# Patient Record
Sex: MALE | Race: BLACK OR AFRICAN AMERICAN | NOT HISPANIC OR LATINO | URBAN - METROPOLITAN AREA
[De-identification: names, ages, dates, MRNs, and addresses within clinical notes are randomized per-mention and may not be internally consistent; named-entity substitution may affect disease eponyms.]

---

## 2017-06-01 ENCOUNTER — OUTPATIENT (OUTPATIENT)
Dept: OUTPATIENT SERVICES | Facility: HOSPITAL | Age: 77
LOS: 1 days | End: 2017-06-01
Payer: MEDICAID

## 2017-06-20 ENCOUNTER — INPATIENT (INPATIENT)
Facility: HOSPITAL | Age: 77
LOS: 4 days | Discharge: HOME | DRG: 247 | End: 2017-06-25
Attending: HOSPITALIST | Admitting: INTERNAL MEDICINE
Payer: MEDICAID

## 2017-06-20 VITALS
SYSTOLIC BLOOD PRESSURE: 158 MMHG | HEIGHT: 66 IN | TEMPERATURE: 97 F | WEIGHT: 187.39 LBS | HEART RATE: 74 BPM | OXYGEN SATURATION: 94 % | DIASTOLIC BLOOD PRESSURE: 106 MMHG | RESPIRATION RATE: 19 BRPM

## 2017-06-20 DIAGNOSIS — I21.3 ST ELEVATION (STEMI) MYOCARDIAL INFARCTION OF UNSPECIFIED SITE: ICD-10-CM

## 2017-06-20 DIAGNOSIS — I23.8 OTHER CURRENT COMPLICATIONS FOLLOWING ACUTE MYOCARDIAL INFARCTION: ICD-10-CM

## 2017-06-20 DIAGNOSIS — Z29.9 ENCOUNTER FOR PROPHYLACTIC MEASURES, UNSPECIFIED: ICD-10-CM

## 2017-06-20 DIAGNOSIS — Z89.619 ACQUIRED ABSENCE OF UNSPECIFIED LEG ABOVE KNEE: Chronic | ICD-10-CM

## 2017-06-20 LAB
ALBUMIN SERPL ELPH-MCNC: 3.6 G/DL — SIGNIFICANT CHANGE UP (ref 3.3–5)
ALP SERPL-CCNC: 57 U/L — SIGNIFICANT CHANGE UP (ref 40–120)
ALT FLD-CCNC: 17 U/L RC — SIGNIFICANT CHANGE UP (ref 10–45)
ANION GAP SERPL CALC-SCNC: 14 MMOL/L — SIGNIFICANT CHANGE UP (ref 5–17)
ANION GAP SERPL CALC-SCNC: 14 MMOL/L — SIGNIFICANT CHANGE UP (ref 5–17)
APTT BLD: 62.9 SEC — HIGH (ref 27.5–37.4)
AST SERPL-CCNC: 25 U/L — SIGNIFICANT CHANGE UP (ref 10–40)
BILIRUB SERPL-MCNC: 0.3 MG/DL — SIGNIFICANT CHANGE UP (ref 0.2–1.2)
BUN SERPL-MCNC: 13 MG/DL — SIGNIFICANT CHANGE UP (ref 7–23)
BUN SERPL-MCNC: 13 MG/DL — SIGNIFICANT CHANGE UP (ref 7–23)
CALCIUM SERPL-MCNC: 8.8 MG/DL — SIGNIFICANT CHANGE UP (ref 8.4–10.5)
CALCIUM SERPL-MCNC: 8.8 MG/DL — SIGNIFICANT CHANGE UP (ref 8.4–10.5)
CHLORIDE SERPL-SCNC: 101 MMOL/L — SIGNIFICANT CHANGE UP (ref 96–108)
CHLORIDE SERPL-SCNC: 104 MMOL/L — SIGNIFICANT CHANGE UP (ref 96–108)
CHOLEST SERPL-MCNC: 198 MG/DL — SIGNIFICANT CHANGE UP (ref 10–199)
CK MB BLD-MCNC: 4 % — HIGH (ref 0–3.5)
CK MB BLD-MCNC: 7.3 % — HIGH (ref 0–3.5)
CK MB BLD-MCNC: 9.6 % — HIGH (ref 0–3.5)
CK MB CFR SERPL CALC: 11 NG/ML — HIGH (ref 0–6.7)
CK MB CFR SERPL CALC: 292.4 NG/ML — HIGH (ref 0–6.7)
CK MB CFR SERPL CALC: 434 NG/ML — HIGH (ref 0–6.7)
CK SERPL-CCNC: 274 U/L — HIGH (ref 30–200)
CK SERPL-CCNC: 3991 U/L — HIGH (ref 30–200)
CK SERPL-CCNC: 4527 U/L — HIGH (ref 30–200)
CO2 SERPL-SCNC: 23 MMOL/L — SIGNIFICANT CHANGE UP (ref 22–31)
CO2 SERPL-SCNC: 24 MMOL/L — SIGNIFICANT CHANGE UP (ref 22–31)
CREAT SERPL-MCNC: 0.65 MG/DL — SIGNIFICANT CHANGE UP (ref 0.5–1.3)
CREAT SERPL-MCNC: 0.81 MG/DL — SIGNIFICANT CHANGE UP (ref 0.5–1.3)
GLUCOSE SERPL-MCNC: 133 MG/DL — HIGH (ref 70–99)
GLUCOSE SERPL-MCNC: 145 MG/DL — HIGH (ref 70–99)
HBA1C BLD-MCNC: 6.1 % — HIGH (ref 4–5.6)
HCT VFR BLD CALC: 39.1 % — SIGNIFICANT CHANGE UP (ref 39–50)
HCT VFR BLD CALC: 42 % — SIGNIFICANT CHANGE UP (ref 39–50)
HDLC SERPL-MCNC: 61 MG/DL — SIGNIFICANT CHANGE UP (ref 40–125)
HGB BLD-MCNC: 12.8 G/DL — LOW (ref 13–17)
HGB BLD-MCNC: 13.4 G/DL — SIGNIFICANT CHANGE UP (ref 13–17)
INR BLD: 1.06 RATIO — SIGNIFICANT CHANGE UP (ref 0.88–1.16)
LIPID PNL WITH DIRECT LDL SERPL: 128 MG/DL — SIGNIFICANT CHANGE UP
MAGNESIUM SERPL-MCNC: 1.9 MG/DL — SIGNIFICANT CHANGE UP (ref 1.6–2.6)
MAGNESIUM SERPL-MCNC: 2.1 MG/DL — SIGNIFICANT CHANGE UP (ref 1.6–2.6)
MCHC RBC-ENTMCNC: 26.1 PG — LOW (ref 27–34)
MCHC RBC-ENTMCNC: 26.3 PG — LOW (ref 27–34)
MCHC RBC-ENTMCNC: 31.9 GM/DL — LOW (ref 32–36)
MCHC RBC-ENTMCNC: 32.6 GM/DL — SIGNIFICANT CHANGE UP (ref 32–36)
MCV RBC AUTO: 80.6 FL — SIGNIFICANT CHANGE UP (ref 80–100)
MCV RBC AUTO: 81.7 FL — SIGNIFICANT CHANGE UP (ref 80–100)
PHOSPHATE SERPL-MCNC: 3 MG/DL — SIGNIFICANT CHANGE UP (ref 2.5–4.5)
PHOSPHATE SERPL-MCNC: 3 MG/DL — SIGNIFICANT CHANGE UP (ref 2.5–4.5)
PLATELET # BLD AUTO: 186 K/UL — SIGNIFICANT CHANGE UP (ref 150–400)
PLATELET # BLD AUTO: 190 K/UL — SIGNIFICANT CHANGE UP (ref 150–400)
POTASSIUM SERPL-MCNC: 4.1 MMOL/L — SIGNIFICANT CHANGE UP (ref 3.5–5.3)
POTASSIUM SERPL-MCNC: 4.4 MMOL/L — SIGNIFICANT CHANGE UP (ref 3.5–5.3)
POTASSIUM SERPL-SCNC: 4.1 MMOL/L — SIGNIFICANT CHANGE UP (ref 3.5–5.3)
POTASSIUM SERPL-SCNC: 4.4 MMOL/L — SIGNIFICANT CHANGE UP (ref 3.5–5.3)
PROT SERPL-MCNC: 7.2 G/DL — SIGNIFICANT CHANGE UP (ref 6–8.3)
PROTHROM AB SERPL-ACNC: 11.5 SEC — SIGNIFICANT CHANGE UP (ref 9.8–12.7)
RBC # BLD: 4.85 M/UL — SIGNIFICANT CHANGE UP (ref 4.2–5.8)
RBC # BLD: 5.14 M/UL — SIGNIFICANT CHANGE UP (ref 4.2–5.8)
RBC # FLD: 13.8 % — SIGNIFICANT CHANGE UP (ref 10.3–14.5)
RBC # FLD: 13.8 % — SIGNIFICANT CHANGE UP (ref 10.3–14.5)
SODIUM SERPL-SCNC: 138 MMOL/L — SIGNIFICANT CHANGE UP (ref 135–145)
SODIUM SERPL-SCNC: 142 MMOL/L — SIGNIFICANT CHANGE UP (ref 135–145)
TOTAL CHOLESTEROL/HDL RATIO MEASUREMENT: 3.2 RATIO — LOW (ref 3.4–9.6)
TRIGL SERPL-MCNC: 46 MG/DL — SIGNIFICANT CHANGE UP (ref 10–149)
TROPONIN T SERPL-MCNC: 0.14 NG/ML — HIGH (ref 0–0.06)
TROPONIN T SERPL-MCNC: 11.77 NG/ML — HIGH (ref 0–0.06)
TROPONIN T SERPL-MCNC: 12.05 NG/ML — HIGH (ref 0–0.06)
TSH SERPL-MCNC: 3.91 UIU/ML — SIGNIFICANT CHANGE UP (ref 0.27–4.2)
WBC # BLD: 10.6 K/UL — HIGH (ref 3.8–10.5)
WBC # BLD: 8.4 K/UL — SIGNIFICANT CHANGE UP (ref 3.8–10.5)
WBC # FLD AUTO: 10.6 K/UL — HIGH (ref 3.8–10.5)
WBC # FLD AUTO: 8.4 K/UL — SIGNIFICANT CHANGE UP (ref 3.8–10.5)

## 2017-06-20 PROCEDURE — 92941 PRQ TRLML REVSC TOT OCCL AMI: CPT | Mod: LD,GC

## 2017-06-20 PROCEDURE — 93306 TTE W/DOPPLER COMPLETE: CPT | Mod: 26

## 2017-06-20 PROCEDURE — 99291 CRITICAL CARE FIRST HOUR: CPT

## 2017-06-20 PROCEDURE — 71010: CPT | Mod: 26

## 2017-06-20 PROCEDURE — 71250 CT THORAX DX C-: CPT | Mod: 26

## 2017-06-20 PROCEDURE — 74177 CT ABD & PELVIS W/CONTRAST: CPT | Mod: 26

## 2017-06-20 PROCEDURE — 93010 ELECTROCARDIOGRAM REPORT: CPT

## 2017-06-20 PROCEDURE — 93458 L HRT ARTERY/VENTRICLE ANGIO: CPT | Mod: 26,59,GC

## 2017-06-20 RX ORDER — CLOPIDOGREL BISULFATE 75 MG/1
75 TABLET, FILM COATED ORAL DAILY
Qty: 0 | Refills: 0 | Status: DISCONTINUED | OUTPATIENT
Start: 2017-06-20 | End: 2017-06-25

## 2017-06-20 RX ORDER — HEPARIN SODIUM 5000 [USP'U]/ML
6500 INJECTION INTRAVENOUS; SUBCUTANEOUS EVERY 6 HOURS
Qty: 0 | Refills: 0 | Status: DISCONTINUED | OUTPATIENT
Start: 2017-06-20 | End: 2017-06-25

## 2017-06-20 RX ORDER — HEPARIN SODIUM 5000 [USP'U]/ML
5000 INJECTION INTRAVENOUS; SUBCUTANEOUS EVERY 8 HOURS
Qty: 0 | Refills: 0 | Status: DISCONTINUED | OUTPATIENT
Start: 2017-06-20 | End: 2017-06-20

## 2017-06-20 RX ORDER — HEPARIN SODIUM 5000 [USP'U]/ML
INJECTION INTRAVENOUS; SUBCUTANEOUS
Qty: 25000 | Refills: 0 | Status: DISCONTINUED | OUTPATIENT
Start: 2017-06-20 | End: 2017-06-25

## 2017-06-20 RX ORDER — ASPIRIN/CALCIUM CARB/MAGNESIUM 324 MG
81 TABLET ORAL DAILY
Qty: 0 | Refills: 0 | Status: DISCONTINUED | OUTPATIENT
Start: 2017-06-20 | End: 2017-06-25

## 2017-06-20 RX ORDER — MAGNESIUM SULFATE 500 MG/ML
1 VIAL (ML) INJECTION ONCE
Qty: 0 | Refills: 0 | Status: COMPLETED | OUTPATIENT
Start: 2017-06-20 | End: 2017-06-20

## 2017-06-20 RX ORDER — ATORVASTATIN CALCIUM 80 MG/1
80 TABLET, FILM COATED ORAL AT BEDTIME
Qty: 0 | Refills: 0 | Status: DISCONTINUED | OUTPATIENT
Start: 2017-06-20 | End: 2017-06-25

## 2017-06-20 RX ORDER — CARVEDILOL PHOSPHATE 80 MG/1
3.12 CAPSULE, EXTENDED RELEASE ORAL EVERY 12 HOURS
Qty: 0 | Refills: 0 | Status: DISCONTINUED | OUTPATIENT
Start: 2017-06-20 | End: 2017-06-25

## 2017-06-20 RX ORDER — HEPARIN SODIUM 5000 [USP'U]/ML
3000 INJECTION INTRAVENOUS; SUBCUTANEOUS EVERY 6 HOURS
Qty: 0 | Refills: 0 | Status: DISCONTINUED | OUTPATIENT
Start: 2017-06-20 | End: 2017-06-25

## 2017-06-20 RX ORDER — HEPARIN SODIUM 5000 [USP'U]/ML
4000 INJECTION INTRAVENOUS; SUBCUTANEOUS EVERY 6 HOURS
Qty: 0 | Refills: 0 | Status: DISCONTINUED | OUTPATIENT
Start: 2017-06-20 | End: 2017-06-20

## 2017-06-20 RX ORDER — LISINOPRIL 2.5 MG/1
5 TABLET ORAL DAILY
Qty: 0 | Refills: 0 | Status: DISCONTINUED | OUTPATIENT
Start: 2017-06-20 | End: 2017-06-25

## 2017-06-20 RX ORDER — HEPARIN SODIUM 5000 [USP'U]/ML
INJECTION INTRAVENOUS; SUBCUTANEOUS
Qty: 25000 | Refills: 0 | Status: DISCONTINUED | OUTPATIENT
Start: 2017-06-20 | End: 2017-06-20

## 2017-06-20 RX ADMIN — ATORVASTATIN CALCIUM 80 MILLIGRAM(S): 80 TABLET, FILM COATED ORAL at 21:19

## 2017-06-20 RX ADMIN — CARVEDILOL PHOSPHATE 3.12 MILLIGRAM(S): 80 CAPSULE, EXTENDED RELEASE ORAL at 17:05

## 2017-06-20 RX ADMIN — HEPARIN SODIUM 1500 UNIT(S)/HR: 5000 INJECTION INTRAVENOUS; SUBCUTANEOUS at 20:30

## 2017-06-20 RX ADMIN — CARVEDILOL PHOSPHATE 3.12 MILLIGRAM(S): 80 CAPSULE, EXTENDED RELEASE ORAL at 06:14

## 2017-06-20 RX ADMIN — LISINOPRIL 5 MILLIGRAM(S): 2.5 TABLET ORAL at 15:45

## 2017-06-20 RX ADMIN — Medication 100 GRAM(S): at 06:14

## 2017-06-20 NOTE — H&P ADULT - ATTENDING COMMENTS
STEMI s/p PCI with BMS to LAD  - educated on the importance of DAPT   - Beta-blocker initiated  - patient is on Lipitor 80 mg daily  - trend cardiac enzymes to peak  - TTE prior to discharge     Will check chest CT to evaluate reported lung mass.

## 2017-06-20 NOTE — H&P ADULT - PROBLEM SELECTOR PLAN 1
-Pan for urgent cath. Pt has been loaded with aspirin/plavix.  -Wll start BB as tolerated. Hold ACEi until after cath. Start lipitor  -continue to trend cardiac enzymes.   -f/u TTE

## 2017-06-20 NOTE — H&P ADULT - ASSESSMENT
76 M with no medical hx transferred from [facility] for urgent cath for STEMI.

## 2017-06-20 NOTE — PROGRESS NOTE ADULT - SUBJECTIVE AND OBJECTIVE BOX
Removal of Femoral Sheath     Pulses in the right lower extremity are palpable. The patient was placed in the supine position. The insertion site was identified and the sutures were removed per protocol. The 6.0 Maori femoral shealth was then removed. Direct pressure was applied for 20 minutes. Monitoring of the right groin and both lower extremities including neuro-vascular checks and vital signs every 15 minutes x 4, then every 30 minutes x 2, then every 1 hour was ordered.       Removed By: CALDERON Alvarez  Date: 6/17/2017   Time: 11:00am    2-hours post-removal of femoral sheath assessment of access site    Vital signs are stable, neuro-vascular status of the lower extremities is intact, stable and there is no evidence of hematoma on the right lower extremity.    Re-assessed By: CALDERON Alvarez  Date: 6/17/2017  Time: 13:00pm Removal of Femoral Sheath     Pulses in the right lower extremity are palpable. The patient was placed in the supine position. The insertion site was identified and the sutures were removed per protocol. The 6.0 German femoral shealth was then removed. Direct pressure was applied for 20 minutes. Monitoring of the right groin and both lower extremities including neuro-vascular checks and vital signs every 15 minutes x 4, then every 30 minutes x 2, then every 1 hour was ordered.       Removed By: CALDERON Alvarez  Date: 6/20/2017   Time: 11:00am    2-hours post-removal of femoral sheath assessment of access site    Vital signs are stable, neuro-vascular status of the lower extremities is intact, stable and there is no evidence of hematoma on the right lower extremity.    Re-assessed By: CALDERON Alvarez  Date: 6/20/2017  Time: 13:00pm

## 2017-06-20 NOTE — H&P ADULT - NSHPSOCIALHISTORY_GEN_ALL_CORE
Former smoker, 1.5 pack years, quit > 20 years ago  Occasional alcohol use, torsten.  Denies drug us

## 2017-06-20 NOTE — H&P ADULT - HISTORY OF PRESENT ILLNESS
76 M with no active medical hx who initially presented to [facility] with abdominal pain that started at 1AM found to have ST-elevations in leads V1-V6 and II, III, and AVF and subsequently transferred to [facility] for urgent cath. Pt reports abdominal pain is sharp in nature. Pain does not radiate and he denies any active chest pain/SOB. Denies nausea, vomiting, melena, or hematochezia. Patient is a former smoker (~1.5 pack years) and has quit for over 20 years. Denies any family history of cardiac disease.

## 2017-06-20 NOTE — H&P ADULT - NSHPPHYSICALEXAM_GEN_ALL_CORE
General: NAD  HEENT: NC/AT  Res: CTAB, no increased WOB  Cardio: S1/S2, RRR  GI: Pain over middle lower abdomen. Soft, NT, with + BS  MSK: RLE stump General: NAD  HEENT: NC/AT  Res: CTAB, no increased WOB  Cardio: S1/S2, RRR  GI: Pain over middle lower abdomen. Soft, NT, with + BS  MSK: RLE stump  Skin: c/d/i  Neuro: No focal deficits  Psych: Appropriate affect

## 2017-06-20 NOTE — CHART NOTE - NSCHARTNOTEFT_GEN_A_CORE
CCU MIDNIGHT ROUNDS    TIERA WITT  [id number]  76y  Male    SUMMARY:    HPI:  76 M with no active medical hx who initially presented to [facility] with abdominal pain that started at 1AM found to have ST-elevations in leads V1-V6 and II, III, and AVF and subsequently transferred to [facility] for urgent cath. Pt reports abdominal pain is sharp in nature. Pain does not radiate and he denies any active chest pain/SOB. Denies nausea, vomiting, melena, or hematochezia. Patient is a former smoker (~1.5 pack years) and has quit for over 20 years. Denies any family history of cardiac disease. (20 Jun 2017 04:34)      NEW EVENTS: Only faint bowel sounds on auscultation of abdomen. CT chest negative for mass      UPDATED VITALS:    ICU Vital Signs Last 24 Hrs  T(C): 37.2, Max: 37.2 (06-20 @ 20:15)  T(F): 98.9, Max: 98.9 (06-20 @ 20:15)  HR: 74 (68 - 86)  BP: 128/75 (123/85 - 168/95)  BP(mean): 91 (91 - 132)  ABP: --  ABP(mean): --  RR: 11 (11 - 27)  SpO2: 96% (90% - 98%)      I&O's Summary  I & Os for 24h ending 20 Jun 2017 07:00  =============================================  IN: 160 ml / OUT: 200 ml / NET: -40 ml    I & Os for current day (as of 20 Jun 2017 23:16)  =============================================  IN: 375 ml / OUT: 1500 ml / NET: -1125 ml          NEW LABS:                          12.8   10.6  )-----------( 186      ( 20 Jun 2017 10:14 )             39.1     06-20    138  |  101  |  13  ----------------------------<  145<H>  4.4   |  23  |  0.65    Ca    8.8      20 Jun 2017 10:14  Phos  3.0     06-20  Mg     2.1     06-20    TPro  7.2  /  Alb  3.6  /  TBili  0.3  /  DBili  x   /  AST  25  /  ALT  17  /  AlkPhos  57  06-20    PT/INR - ( 20 Jun 2017 04:53 )   PT: 11.5 sec;   INR: 1.06 ratio         PTT - ( 20 Jun 2017 04:53 )  PTT:62.9 sec  CARDIAC MARKERS ( 20 Jun 2017 17:29 )  x     / 12.05 ng/mL / 3991 U/L / x     / 292.4 ng/mL  CARDIAC MARKERS ( 20 Jun 2017 10:14 )  x     / 11.77 ng/mL / 4527 U/L / x     / 434.0 ng/mL  CARDIAC MARKERS ( 20 Jun 2017 04:53 )  x     / 0.14 ng/mL / 274 U/L / x     / 11.0 ng/mL            PLAN:  CAD - continue ASA, Plavix TTE in am           serial EKG's           -lung mass not evident on CT  f/u        NIRU Boyce [id number] CCU MIDNIGHT ROUNDS    TIERA WITT  [id number]  76y  Male    SUMMARY:    HPI:  76 M with no active medical hx who initially presented to [facility] with abdominal pain that started at 1AM found to have ST-elevations in leads V1-V6 and II, III, and AVF and subsequently transferred to [facility] for urgent cath. Pt reports abdominal pain is sharp in nature. Pain does not radiate and he denies any active chest pain/SOB. Denies nausea, vomiting, melena, or hematochezia. Patient is a former smoker (~1.5 pack years) and has quit for over 20 years. Denies any family history of cardiac disease. (20 Jun 2017 04:34)      NEW EVENTS: Only faint bowel sounds on auscultation of abdomen. CT chest negative for mass      UPDATED VITALS:    ICU Vital Signs Last 24 Hrs  T(C): 37.2, Max: 37.2 (06-20 @ 20:15)  T(F): 98.9, Max: 98.9 (06-20 @ 20:15)  HR: 74 (68 - 86)  BP: 128/75 (123/85 - 168/95)  BP(mean): 91 (91 - 132)  ABP: --  ABP(mean): --  RR: 11 (11 - 27)  SpO2: 96% (90% - 98%)      I&O's Summary  I & Os for 24h ending 20 Jun 2017 07:00  =============================================  IN: 160 ml / OUT: 200 ml / NET: -40 ml    I & Os for current day (as of 20 Jun 2017 23:16)  =============================================  IN: 375 ml / OUT: 1500 ml / NET: -1125 ml          NEW LABS:                          12.8   10.6  )-----------( 186      ( 20 Jun 2017 10:14 )             39.1     06-20    138  |  101  |  13  ----------------------------<  145<H>  4.4   |  23  |  0.65    Ca    8.8      20 Jun 2017 10:14  Phos  3.0     06-20  Mg     2.1     06-20    TPro  7.2  /  Alb  3.6  /  TBili  0.3  /  DBili  x   /  AST  25  /  ALT  17  /  AlkPhos  57  06-20    PT/INR - ( 20 Jun 2017 04:53 )   PT: 11.5 sec;   INR: 1.06 ratio         PTT - ( 20 Jun 2017 04:53 )  PTT:62.9 sec  CARDIAC MARKERS ( 20 Jun 2017 17:29 )  x     / 12.05 ng/mL / 3991 U/L / x     / 292.4 ng/mL  CARDIAC MARKERS ( 20 Jun 2017 10:14 )  x     / 11.77 ng/mL / 4527 U/L / x     / 434.0 ng/mL  CARDIAC MARKERS ( 20 Jun 2017 04:53 )  x     / 0.14 ng/mL / 274 U/L / x     / 11.0 ng/mL            PLAN:  CAD - continue ASA, Plavix TTE in am           serial EKG's           -lung mass not evident on CT           -continue heparin gtt for sw        NIRU Boyce [id number]

## 2017-06-21 DIAGNOSIS — R69 ILLNESS, UNSPECIFIED: ICD-10-CM

## 2017-06-21 DIAGNOSIS — Z00.8 ENCOUNTER FOR OTHER GENERAL EXAMINATION: ICD-10-CM

## 2017-06-21 DIAGNOSIS — R94.5 ABNORMAL RESULTS OF LIVER FUNCTION STUDIES: ICD-10-CM

## 2017-06-21 DIAGNOSIS — K59.8 OTHER SPECIFIED FUNCTIONAL INTESTINAL DISORDERS: ICD-10-CM

## 2017-06-21 LAB
ALBUMIN SERPL ELPH-MCNC: 3.3 G/DL — SIGNIFICANT CHANGE UP (ref 3.3–5)
ALP SERPL-CCNC: 52 U/L — SIGNIFICANT CHANGE UP (ref 40–120)
ALT FLD-CCNC: 48 U/L RC — HIGH (ref 10–45)
ANION GAP SERPL CALC-SCNC: 12 MMOL/L — SIGNIFICANT CHANGE UP (ref 5–17)
APTT BLD: 55.5 SEC — HIGH (ref 27.5–37.4)
APTT BLD: 60.1 SEC — HIGH (ref 27.5–37.4)
APTT BLD: 85.5 SEC — HIGH (ref 27.5–37.4)
AST SERPL-CCNC: 221 U/L — HIGH (ref 10–40)
BILIRUB SERPL-MCNC: 0.8 MG/DL — SIGNIFICANT CHANGE UP (ref 0.2–1.2)
BUN SERPL-MCNC: 12 MG/DL — SIGNIFICANT CHANGE UP (ref 7–23)
CALCIUM SERPL-MCNC: 8.4 MG/DL — SIGNIFICANT CHANGE UP (ref 8.4–10.5)
CHLORIDE SERPL-SCNC: 102 MMOL/L — SIGNIFICANT CHANGE UP (ref 96–108)
CK MB BLD-MCNC: 5.5 % — HIGH (ref 0–3.5)
CK MB CFR SERPL CALC: 154.8 NG/ML — HIGH (ref 0–6.7)
CK SERPL-CCNC: 2833 U/L — HIGH (ref 30–200)
CO2 SERPL-SCNC: 23 MMOL/L — SIGNIFICANT CHANGE UP (ref 22–31)
CREAT SERPL-MCNC: 0.75 MG/DL — SIGNIFICANT CHANGE UP (ref 0.5–1.3)
GLUCOSE SERPL-MCNC: 130 MG/DL — HIGH (ref 70–99)
HCT VFR BLD CALC: 38.8 % — LOW (ref 39–50)
HCT VFR BLD CALC: 39.1 % — SIGNIFICANT CHANGE UP (ref 39–50)
HGB BLD-MCNC: 12.4 G/DL — LOW (ref 13–17)
HGB BLD-MCNC: 12.6 G/DL — LOW (ref 13–17)
INR BLD: 1.05 RATIO — SIGNIFICANT CHANGE UP (ref 0.88–1.16)
INR BLD: 1.06 RATIO — SIGNIFICANT CHANGE UP (ref 0.88–1.16)
MAGNESIUM SERPL-MCNC: 2 MG/DL — SIGNIFICANT CHANGE UP (ref 1.6–2.6)
MCHC RBC-ENTMCNC: 25.9 PG — LOW (ref 27–34)
MCHC RBC-ENTMCNC: 26.2 PG — LOW (ref 27–34)
MCHC RBC-ENTMCNC: 31.9 GM/DL — LOW (ref 32–36)
MCHC RBC-ENTMCNC: 32.4 GM/DL — SIGNIFICANT CHANGE UP (ref 32–36)
MCV RBC AUTO: 80.9 FL — SIGNIFICANT CHANGE UP (ref 80–100)
MCV RBC AUTO: 81.4 FL — SIGNIFICANT CHANGE UP (ref 80–100)
PHOSPHATE SERPL-MCNC: 3.1 MG/DL — SIGNIFICANT CHANGE UP (ref 2.5–4.5)
PLATELET # BLD AUTO: 172 K/UL — SIGNIFICANT CHANGE UP (ref 150–400)
PLATELET # BLD AUTO: 175 K/UL — SIGNIFICANT CHANGE UP (ref 150–400)
POTASSIUM SERPL-MCNC: 3.9 MMOL/L — SIGNIFICANT CHANGE UP (ref 3.5–5.3)
POTASSIUM SERPL-SCNC: 3.9 MMOL/L — SIGNIFICANT CHANGE UP (ref 3.5–5.3)
PROT SERPL-MCNC: 6.6 G/DL — SIGNIFICANT CHANGE UP (ref 6–8.3)
PROTHROM AB SERPL-ACNC: 11.5 SEC — SIGNIFICANT CHANGE UP (ref 9.8–12.7)
PROTHROM AB SERPL-ACNC: 11.5 SEC — SIGNIFICANT CHANGE UP (ref 9.8–12.7)
RBC # BLD: 4.77 M/UL — SIGNIFICANT CHANGE UP (ref 4.2–5.8)
RBC # BLD: 4.82 M/UL — SIGNIFICANT CHANGE UP (ref 4.2–5.8)
RBC # FLD: 13.7 % — SIGNIFICANT CHANGE UP (ref 10.3–14.5)
RBC # FLD: 13.8 % — SIGNIFICANT CHANGE UP (ref 10.3–14.5)
SODIUM SERPL-SCNC: 137 MMOL/L — SIGNIFICANT CHANGE UP (ref 135–145)
TROPONIN T SERPL-MCNC: 7.64 NG/ML — HIGH (ref 0–0.06)
WBC # BLD: 8.5 K/UL — SIGNIFICANT CHANGE UP (ref 3.8–10.5)
WBC # BLD: 9.3 K/UL — SIGNIFICANT CHANGE UP (ref 3.8–10.5)
WBC # FLD AUTO: 8.5 K/UL — SIGNIFICANT CHANGE UP (ref 3.8–10.5)
WBC # FLD AUTO: 9.3 K/UL — SIGNIFICANT CHANGE UP (ref 3.8–10.5)

## 2017-06-21 PROCEDURE — 74000: CPT | Mod: 26,76

## 2017-06-21 PROCEDURE — 99232 SBSQ HOSP IP/OBS MODERATE 35: CPT | Mod: GC

## 2017-06-21 PROCEDURE — 99233 SBSQ HOSP IP/OBS HIGH 50: CPT

## 2017-06-21 PROCEDURE — 93010 ELECTROCARDIOGRAM REPORT: CPT

## 2017-06-21 RX ORDER — POLYETHYLENE GLYCOL 3350 17 G/17G
17 POWDER, FOR SOLUTION ORAL DAILY
Qty: 0 | Refills: 0 | Status: DISCONTINUED | OUTPATIENT
Start: 2017-06-21 | End: 2017-06-25

## 2017-06-21 RX ORDER — WARFARIN SODIUM 2.5 MG/1
5 TABLET ORAL ONCE
Qty: 0 | Refills: 0 | Status: COMPLETED | OUTPATIENT
Start: 2017-06-21 | End: 2017-06-21

## 2017-06-21 RX ORDER — POTASSIUM CHLORIDE 20 MEQ
20 PACKET (EA) ORAL ONCE
Qty: 0 | Refills: 0 | Status: COMPLETED | OUTPATIENT
Start: 2017-06-21 | End: 2017-06-21

## 2017-06-21 RX ADMIN — CARVEDILOL PHOSPHATE 3.12 MILLIGRAM(S): 80 CAPSULE, EXTENDED RELEASE ORAL at 17:07

## 2017-06-21 RX ADMIN — WARFARIN SODIUM 5 MILLIGRAM(S): 2.5 TABLET ORAL at 21:08

## 2017-06-21 RX ADMIN — HEPARIN SODIUM 1700 UNIT(S)/HR: 5000 INJECTION INTRAVENOUS; SUBCUTANEOUS at 03:00

## 2017-06-21 RX ADMIN — POLYETHYLENE GLYCOL 3350 17 GRAM(S): 17 POWDER, FOR SOLUTION ORAL at 14:08

## 2017-06-21 RX ADMIN — ATORVASTATIN CALCIUM 80 MILLIGRAM(S): 80 TABLET, FILM COATED ORAL at 21:08

## 2017-06-21 RX ADMIN — CLOPIDOGREL BISULFATE 75 MILLIGRAM(S): 75 TABLET, FILM COATED ORAL at 11:00

## 2017-06-21 RX ADMIN — CARVEDILOL PHOSPHATE 3.12 MILLIGRAM(S): 80 CAPSULE, EXTENDED RELEASE ORAL at 05:29

## 2017-06-21 RX ADMIN — HEPARIN SODIUM 1700 UNIT(S)/HR: 5000 INJECTION INTRAVENOUS; SUBCUTANEOUS at 17:34

## 2017-06-21 RX ADMIN — Medication 20 MILLIEQUIVALENT(S): at 05:30

## 2017-06-21 RX ADMIN — HEPARIN SODIUM 1700 UNIT(S)/HR: 5000 INJECTION INTRAVENOUS; SUBCUTANEOUS at 11:02

## 2017-06-21 RX ADMIN — Medication 81 MILLIGRAM(S): at 11:00

## 2017-06-21 RX ADMIN — LISINOPRIL 5 MILLIGRAM(S): 2.5 TABLET ORAL at 05:30
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
## 2017-06-21 NOTE — PROGRESS NOTE ADULT - PROBLEM SELECTOR PLAN 2
-Found on Chest CT yesterday that was looking for chest mass (no chest mass visualized)  -CT A/P w/ IV contrast performed immediately  -Redundant sigmoid colon with marked air distention. No swirling of   the mesentery. No obstructing mass. No sigmoid volvulus. Evaluate for   associated clinical symptom(s) and if present recommend decompression   with rectal tube.  -GI called last night; recommended saline enema as first line, which was attempted, but not successful.  -If no flatus or BM this AM will call GI to request endoscopic placement of rectal tube for decompression.  -NPO for now

## 2017-06-21 NOTE — PROGRESS NOTE ADULT - SUBJECTIVE AND OBJECTIVE BOX
PATIENT:  TIERA WITT  [id number]    CHIEF COMPLAINT:  Patient is a 76y old  Male who presents with a chief complaint of abdominal pain (2017 04:34)      INTERVAL HISTORY:      REVIEW OF SYSTEMS:    CONSTITUTIONAL: No weakness, fevers or chills  EYES/ENT: No visual changes;  No vertigo or throat pain   NECK: No pain or stiffness  RESPIRATORY: No cough, wheezing, hemoptysis; No shortness of breath  CARDIOVASCULAR: No chest pain or palpitations  GASTROINTESTINAL: No abdominal or epigastric pain. No nausea, vomiting, or hematemesis; No diarrhea or constipation. No melena or hematochezia.  GENITOURINARY: No dysuria, frequency or hematuria  NEUROLOGICAL: No numbness or weakness  SKIN: No itching, burning, rashes, or lesions   All other review of systems is negative unless indicated above.    MEDICATIONS  (STANDING):  aspirin  chewable 81milliGRAM(s) Oral daily  clopidogrel Tablet 75milliGRAM(s) Oral daily  atorvastatin 80milliGRAM(s) Oral at bedtime  carvedilol 3.125milliGRAM(s) Oral every 12 hours  lisinopril 5milliGRAM(s) Oral daily  heparin  Infusion. Unit(s)/Hr IV Continuous <Continuous>    MEDICATIONS  (PRN):  heparin  Injectable 6500Unit(s) IV Push every 6 hours PRN For aPTT less than 40  heparin  Injectable 3000Unit(s) IV Push every 6 hours PRN For aPTT between 40 - 57      OBJECTIVE:  ICU Vital Signs Last 24 Hrs  T(C): 36.6, Max: 37.2 (06-20 @ 20:15)  T(F): 97.8, Max: 98.9 (06-20 @ 20:15)  HR: 74 (68 - 86)  BP: 118/72 (92/59 - 168/95)  BP(mean): 84 (70 - 121)  ABP: --  ABP(mean): --  RR: 17 (11 - 27)  SpO2: 96% (93% - 98%)      Adult Advanced Hemodynamics Last 24 Hrs  CVP(mm Hg): --  CVP(cm H2O): --  CO: --  CI: --  PA: --  PA(mean): --  PCWP: --  SVR: --  SVRI: --  PVR: --  PVRI: --  CAPILLARY BLOOD GLUCOSE    CAPILLARY BLOOD GLUCOSE      I&O's Summary  I & Os for 24h ending 2017 07:00  =============================================  IN: 636 ml / OUT: 1800 ml / NET: -1164 ml    I & Os for current day (as of 2017 07:53)  =============================================  IN: 0 ml / OUT: 225 ml / NET: -225 ml    Daily     Daily Weight in k.4 (2017 04:00)    General: WN/WD NAD  HEENT: PERRLA, EOMI, moist mucous membranes  Neurology: A&Ox3, nonfocal, BERKOWITZ x 4  Respiratory: CTA B/L, normal respiratory effort, no wheezes, crackles, rales  CV: RRR, S1S2, no murmurs, rubs or gallops  Abdominal: Soft, NT, ND +BS, Last BM  Extremities: No edema, + peripheral pulses  Incisions:   Tubes:    TELEMETRY:     EKG:     IMAGING:    LABS:                          12.6   9.3   )-----------( 172      ( 2017 01:48 )             39.1     -21    137  |  102  |  12  ----------------------------<  130<H>  3.9   |  23  |  0.75    Ca    8.4      2017 01:48  Phos  3.1     -  Mg     2.0         TPro  6.6  /  Alb  3.3  /  TBili  0.8  /  DBili  x   /  AST  221<H>  /  ALT  48<H>  /  AlkPhos  52  -21    LIVER FUNCTIONS - ( 2017 01:48 )  Alb: 3.3 g/dL / Pro: 6.6 g/dL / ALK PHOS: 52 U/L / ALT: 48 U/L RC / AST: 221 U/L / GGT: x           PT/INR - ( 2017 01:48 )   PT: 11.5 sec;   INR: 1.05 ratio         PTT - ( 2017 01:48 )  PTT:55.5 sec  CKMB Units: 154.8 ng/mL ( 01:48)  Creatine Kinase, Serum: 2833 U/L (:48)  Troponin T, Serum: 7.64 ng/mL (:48)  Troponin T, Serum: 12.05 ng/mL ( 17:29)  Creatine Kinase, Serum: 3991 U/L ( @ 17:29)  CKMB Units: 292.4 ng/mL ( @ 17:29)  Troponin T, Serum: 11.77 ng/mL ( @ 10:14)  Creatine Kinase, Serum: 4527 U/L ( @ 10:14)  CKMB Units: 434.0 ng/mL ( 10:14) PATIENT:  TIERA WITT  [id number]    CHIEF COMPLAINT:  Patient is a 76y old  Male who presents with a chief complaint of abdominal pain (2017 04:34)    INTERVAL HISTORY:  Tolerated cath well. Received BMS 2/2 concern for chest mass seen on XR. CT chest ordered to wendi. No evidence of mass on chest CT. What was visualized was large, dilated loops of colon. Immediate CT a/p performed which demonstrated colonic pseudoobstruction. Attempted saline enima per recs from GI without resolution. Will monitor for flatus/BM this AM, and if no progress will discuss with GI endoscopic placement of rectal tube in endo suite. Pt's cardiac enzymes have peaked. Pt is on heparin drip because TTE yesterday showed evidence of apical ballooning with definity swirl, risk for LV thrombus (no thrombus actually seen). Pt's LFTs noted to be elevated this AM in setting of starting statin therapy overnight. Pt feeling distended but overall well this AM.    REVIEW OF SYSTEMS:    CONSTITUTIONAL: No weakness, fevers or chills  EYES/ENT: No visual changes;  No vertigo or throat pain   NECK: No pain or stiffness  RESPIRATORY: No cough, wheezing, hemoptysis; No shortness of breath  CARDIOVASCULAR: No chest pain or palpitations  GASTROINTESTINAL: +Distention and mild discomfort. +Constipation. No nausea, vomiting, or hematemesis; No diarrhea. No melena or hematochezia.  GENITOURINARY: No dysuria, frequency or hematuria  NEUROLOGICAL: No numbness or weakness  SKIN: No itching, burning, rashes, or lesions   All other review of systems is negative unless indicated above.    MEDICATIONS  (STANDING):  aspirin  chewable 81milliGRAM(s) Oral daily  clopidogrel Tablet 75milliGRAM(s) Oral daily  atorvastatin 80milliGRAM(s) Oral at bedtime  carvedilol 3.125milliGRAM(s) Oral every 12 hours  lisinopril 5milliGRAM(s) Oral daily  heparin  Infusion. Unit(s)/Hr IV Continuous <Continuous>    MEDICATIONS  (PRN):  heparin  Injectable 6500Unit(s) IV Push every 6 hours PRN For aPTT less than 40  heparin  Injectable 3000Unit(s) IV Push every 6 hours PRN For aPTT between 40 - 57      OBJECTIVE:  ICU Vital Signs Last 24 Hrs  T(C): 36.6, Max: 37.2 (-20 @ 20:15)  T(F): 97.8, Max: 98.9 (06-20 @ 20:15)  HR: 74 (68 - 86)  BP: 118/72 (92/59 - 168/95)  BP(mean): 84 (70 - 121)  RR: 17 (11 - 27)  SpO2: 96% (93% - 98%)    I&O's Summary  I & Os for 24h ending 2017 07:00  =============================================  IN: 636 ml / OUT: 1800 ml / NET: -1164 ml    I & Os for current day (as of 2017 07:53)  =============================================  IN: 0 ml / OUT: 225 ml / NET: -225 ml    Daily     Daily Weight in k.4 (2017 04:00)    General: WN/WD NAD  HEENT: PERRLA, EOMI, moist mucous membranes  Neurology: A&Ox3, nonfocal, BERKOWITZ x 3 (s/p AKA)  Respiratory: CTA B/L, normal respiratory effort, no wheezes, crackles, rales  CV: RRR, S1S2, no murmurs, rubs or gallops  Abdominal: Distended, NT, +BS, Last BM   Extremities: No edema, + peripheral pulses    TELEMETRY: No events    EKG: NSR w/ 1st degree AV block, QTc 508, Anteroseptal Q waves (II, III, aVF, precordial)    IMAGING:  CT chest/abdomen/pelvis w/ IV contrast:  1.  Redundant sigmoid colon with marked air distention. No swirling of   the mesentery. No obstructing mass. No sigmoid volvulus. Evaluate for   associated clinical symptom(s) and if present recommend decompression   with rectal tube.  2.  No evidence of a chest mass.  3.  Distal appendix is dilated to 1.1 cm. No periappendiceal   inflammation. Clinical correlate and obtain follow-up as clinically   warranted.    CXR:  Limited by lordotic projection and rotation. The lungs are grossly clear.   If there is concern for right lung mass recommend noncontrast CT for more   definitive evaluation.      LABS:                          12.6   9.3   )-----------( 172      ( 2017 01:48 )             39.1         137  |  102  |  12  ----------------------------<  130<H>  3.9   |  23  |  0.75    Ca    8.4      2017 01:48  Phos  3.1       Mg     2.0         TPro  6.6  /  Alb  3.3  /  TBili  0.8  /  DBili  x   /  AST  221<H>  /  ALT  48<H>  /  AlkPhos  52      LIVER FUNCTIONS - ( 2017 01:48 )  Alb: 3.3 g/dL / Pro: 6.6 g/dL / ALK PHOS: 52 U/L / ALT: 48 U/L RC / AST: 221 U/L / GGT: x           PT/INR - ( 2017 01:48 )   PT: 11.5 sec;   INR: 1.05 ratio         PTT - ( 2017 01:48 )  PTT:55.5 sec  CKMB Units: 154.8 ng/mL ( @ 01:48)  Creatine Kinase, Serum: 2833 U/L ( @ 01:48)  Troponin T, Serum: 7.64 ng/mL ( 01:48)  Troponin T, Serum: 12.05 ng/mL ( @ 17:29)  Creatine Kinase, Serum: 3991 U/L ( @ 17:29)  CKMB Units: 292.4 ng/mL ( @ 17:29)  Troponin T, Serum: 11.77 ng/mL ( @ 10:14)  Creatine Kinase, Serum: 4527 U/L ( @ 10:14)  CKMB Units: 434.0 ng/mL ( @ 10:14)

## 2017-06-21 NOTE — PROGRESS NOTE ADULT - PROBLEM SELECTOR PLAN 2
-Found on Chest CT yesterday that was looking for chest mass (no chest mass visualized)  -CT A/P w/ IV contrast performed immediately  -Redundant sigmoid colon with marked air distention. No swirling of   the mesentery. No obstructing mass. No sigmoid volvulus. Evaluate for   associated clinical symptom(s) and if present recommend decompression   with rectal tube.  -GI called last night; recommended saline enema as first line, which was attempted, but not successful.  -BM this AM with some relief  -f/u repeat Abd xray  - If still distended will need rectal tube for decompression.  - Clear liquid diet  - Miralax daily

## 2017-06-21 NOTE — PROGRESS NOTE ADULT - SUBJECTIVE AND OBJECTIVE BOX
==============  CCU Transfer Note  ==============    PATIENT:  TIERA WITT  [id number]    76 M with no active medical hx who initially presented to [facility] with abdominal pain that started at 1AM found to have ST-elevations in leads V1-V6 and II, III, and AVF and subsequently transferred to [facility] for urgent cath. Pt reported abdominal pain is sharp in nature. Pt went to cath lab and received a bare metal stent to the proximal LAD (% occluded). He received a BMS instead of a drug eluting stent because CXR at OSH had findings concerning for chest mass, and BMS are favored in setting of cancer. Pt's enzymes are now downtrending. Pt is chest pain free. TTE showed EF 30-35% w/ mod-severe seg LV systolic dysfunction w/ anteroseptal and inferoseptal walls hypokinetic and the apical cap is akinetic. No LV thrombus seen, but swirling contrast suggests low flow state. Pt started on heparin drip for now, with plan to start coumadin 5mg tonight.    Pt will need 1 year of antiplatelet therapy. ASA can be discontinued after 1 month. Pt will need outpatient echo in 1 month. If repeat echo shows no evidence of LV thrombus then coumadin may be discontinued.    Yesterday CT chest was performed to eval for possible mass, but was normal w/o evidence of mass. What was visualized was a very large dilated loop of bowel. 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 Pt was made NPO and received enema overnight w/o BM initially. This AM pt finally had large BM with some relief. GI consulted and recommending serial belly exams, miralax, and if no improvement in dilation, then placement of rectal tube by primary team. Repeat abdominal xray ordered, not performed. ==============  CCU Transfer Note  ==============    PATIENT:  TIERA WITT  [id number]    76 M with no active medical hx who initially presented to [facility] with abdominal pain that started at 1AM found to have ST-elevations in leads V1-V6 and II, III, and AVF and subsequently transferred to [facility] for urgent cath. Pt reported abdominal pain is sharp in nature. Pt went to cath lab and received a bare metal stent to the proximal LAD (% occluded). He received a BMS instead of a drug eluting stent because CXR at OSH had findings concerning for chest mass, and BMS are favored in setting of cancer. Pt's enzymes are now downtrending. Pt is chest pain free. TTE showed EF 30-35% w/ mod-severe seg LV systolic dysfunction w/ anteroseptal and inferoseptal walls hypokinetic and the apical cap is akinetic. No LV thrombus seen, but swirling contrast suggests low flow state. Pt started on heparin drip for now, with plan to start coumadin 5mg tonight.    Pt will need 1 year of dual antiplatelet therapy. ASA can be discontinued after 1 year, but may need for rest of life. Pt will need outpatient echo in 1 month. If repeat echo shows no evidence of LV thrombus then coumadin may be discontinued at that time.    Yesterday CT chest was performed to eval for possible mass, but was normal w/o evidence of mass. What was visualized was a very large dilated loop of bowel. 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 Pt was made NPO and received enema overnight w/o BM initially. This AM pt finally had large BM with some relief. GI consulted and recommending serial belly exams, miralax, and if no improvement in dilation, then placement of rectal tube by primary team. Repeat abdominal xray ordered, not performed.

## 2017-06-21 NOTE — CONSULT NOTE ADULT - ASSESSMENT
Sigmoid colon distension :    Subjective improvement in distension after large BM.  Serial abdominal exam, Daily KUB.  Check electrolytes daily and replete them as needed.  If persistent distension on KUB, will need rectal tube. Sigmoid colon distension :    Subjective improvement in distension after large BM.  Serial abdominal exam, Daily KUB.  Check electrolytes daily and replete them as needed.  If persistent distension on KUB, will need rectal tube.  miralax daily

## 2017-06-22 LAB
ANION GAP SERPL CALC-SCNC: 6 MMOL/L — SIGNIFICANT CHANGE UP (ref 5–17)
APTT BLD: 78.9 SEC — HIGH (ref 27.5–37.4)
BUN SERPL-MCNC: 9 MG/DL — SIGNIFICANT CHANGE UP (ref 7–23)
CALCIUM SERPL-MCNC: 8.7 MG/DL — SIGNIFICANT CHANGE UP (ref 8.4–10.5)
CHLORIDE SERPL-SCNC: 103 MMOL/L — SIGNIFICANT CHANGE UP (ref 96–108)
CO2 SERPL-SCNC: 31 MMOL/L — SIGNIFICANT CHANGE UP (ref 22–31)
CREAT SERPL-MCNC: 0.89 MG/DL — SIGNIFICANT CHANGE UP (ref 0.5–1.3)
GLUCOSE SERPL-MCNC: 107 MG/DL — HIGH (ref 70–99)
HCT VFR BLD CALC: 39.6 % — SIGNIFICANT CHANGE UP (ref 39–50)
HGB BLD-MCNC: 12.8 G/DL — LOW (ref 13–17)
MAGNESIUM SERPL-MCNC: 2 MG/DL — SIGNIFICANT CHANGE UP (ref 1.6–2.6)
MCHC RBC-ENTMCNC: 26.3 PG — LOW (ref 27–34)
MCHC RBC-ENTMCNC: 32.3 GM/DL — SIGNIFICANT CHANGE UP (ref 32–36)
MCV RBC AUTO: 81.3 FL — SIGNIFICANT CHANGE UP (ref 80–100)
PHOSPHATE SERPL-MCNC: 3 MG/DL — SIGNIFICANT CHANGE UP (ref 2.5–4.5)
PLATELET # BLD AUTO: 175 K/UL — SIGNIFICANT CHANGE UP (ref 150–400)
POTASSIUM SERPL-MCNC: 4.4 MMOL/L — SIGNIFICANT CHANGE UP (ref 3.5–5.3)
POTASSIUM SERPL-SCNC: 4.4 MMOL/L — SIGNIFICANT CHANGE UP (ref 3.5–5.3)
RBC # BLD: 4.87 M/UL — SIGNIFICANT CHANGE UP (ref 4.2–5.8)
RBC # FLD: 13.7 % — SIGNIFICANT CHANGE UP (ref 10.3–14.5)
SODIUM SERPL-SCNC: 140 MMOL/L — SIGNIFICANT CHANGE UP (ref 135–145)
WBC # BLD: 7.9 K/UL — SIGNIFICANT CHANGE UP (ref 3.8–10.5)
WBC # FLD AUTO: 7.9 K/UL — SIGNIFICANT CHANGE UP (ref 3.8–10.5)

## 2017-06-22 PROCEDURE — 99233 SBSQ HOSP IP/OBS HIGH 50: CPT

## 2017-06-22 PROCEDURE — 74000: CPT | Mod: 26

## 2017-06-22 PROCEDURE — 99232 SBSQ HOSP IP/OBS MODERATE 35: CPT | Mod: GC

## 2017-06-22 PROCEDURE — 99233 SBSQ HOSP IP/OBS HIGH 50: CPT | Mod: GC

## 2017-06-22 RX ORDER — WARFARIN SODIUM 2.5 MG/1
5 TABLET ORAL ONCE
Qty: 0 | Refills: 0 | Status: COMPLETED | OUTPATIENT
Start: 2017-06-22 | End: 2017-06-22

## 2017-06-22 RX ADMIN — POLYETHYLENE GLYCOL 3350 17 GRAM(S): 17 POWDER, FOR SOLUTION ORAL at 12:56

## 2017-06-22 RX ADMIN — WARFARIN SODIUM 5 MILLIGRAM(S): 2.5 TABLET ORAL at 21:11

## 2017-06-22 RX ADMIN — LISINOPRIL 5 MILLIGRAM(S): 2.5 TABLET ORAL at 05:20

## 2017-06-22 RX ADMIN — HEPARIN SODIUM 1700 UNIT(S)/HR: 5000 INJECTION INTRAVENOUS; SUBCUTANEOUS at 10:06

## 2017-06-22 RX ADMIN — CARVEDILOL PHOSPHATE 3.12 MILLIGRAM(S): 80 CAPSULE, EXTENDED RELEASE ORAL at 17:39

## 2017-06-22 RX ADMIN — Medication 81 MILLIGRAM(S): at 12:56

## 2017-06-22 RX ADMIN — ATORVASTATIN CALCIUM 80 MILLIGRAM(S): 80 TABLET, FILM COATED ORAL at 21:11

## 2017-06-22 RX ADMIN — CARVEDILOL PHOSPHATE 3.12 MILLIGRAM(S): 80 CAPSULE, EXTENDED RELEASE ORAL at 05:20

## 2017-06-22 RX ADMIN — CLOPIDOGREL BISULFATE 75 MILLIGRAM(S): 75 TABLET, FILM COATED ORAL at 12:56

## 2017-06-22 NOTE — PROGRESS NOTE ADULT - SUBJECTIVE AND OBJECTIVE BOX
Patient is a 76y old  Male who presents with a chief complaint of abdominal pain (20 Jun 2017 04:34)      SUBJECTIVE / OVERNIGHT EVENTS:  no complaints of abdominal pain, CP, SOB, fevers/chills, all other ROS is negative    MEDICATIONS  (STANDING):  aspirin  chewable 81milliGRAM(s) Oral daily  clopidogrel Tablet 75milliGRAM(s) Oral daily  atorvastatin 80milliGRAM(s) Oral at bedtime  carvedilol 3.125milliGRAM(s) Oral every 12 hours  lisinopril 5milliGRAM(s) Oral daily  heparin  Infusion. Unit(s)/Hr IV Continuous <Continuous>  polyethylene glycol 3350 17Gram(s) Oral daily  warfarin 5milliGRAM(s) Oral once    MEDICATIONS  (PRN):  heparin  Injectable 6500Unit(s) IV Push every 6 hours PRN For aPTT less than 40  heparin  Injectable 3000Unit(s) IV Push every 6 hours PRN For aPTT between 40 - 57      Vital Signs Last 24 Hrs  T(C): 36.6, Max: 36.9 (06-21 @ 19:35)  HR: 85 (70 - 85)  BP: 109/75 (102/58 - 156/94)  RR: 17 (15 - 27)  SpO2: 97% (95% - 98%)  Wt(kg): --  CAPILLARY BLOOD GLUCOSE    I&O's Summary  I & Os for 24h ending 22 Jun 2017 07:00  =============================================  IN: 1747 ml / OUT: 2003 ml / NET: -256 ml    I & Os for current day (as of 22 Jun 2017 12:33)  =============================================  IN: 0 ml / OUT: 220 ml / NET: -220 ml      PHYSICAL EXAM:  GENERAL: NAD, well-developed  EYES: EOMI, PERRLA, conjunctiva and sclera clear  NECK: Supple, No JVD  CHEST/LUNG: Clear to auscultation bilaterally; No wheeze  HEART: Regular rate and rhythm; No murmurs, rubs, or gallops  ABDOMEN: Soft, Nontender, mild distension; Bowel sounds present  EXTREMITIES: No clubbing, cyanosis, or edema, has prothesis on right leg  NEUROLOGY: non-focal  SKIN: No rashes or lesions    LABS:                        12.8   7.9   )-----------( 175      ( 22 Jun 2017 06:42 )             39.6     06-22    140  |  103  |  9   ----------------------------<  107<H>  4.4   |  31  |  0.89    Ca    8.7      22 Jun 2017 06:42  Phos  3.0     06-22  Mg     2.0     06-22    TPro  6.6  /  Alb  3.3  /  TBili  0.8  /  DBili  x   /  AST  221<H>  /  ALT  48<H>  /  AlkPhos  52  06-21    PT/INR - ( 22 Jun 2017 06:44 )   PT: 12.0 sec;   INR: 1.10 ratio         PTT - ( 22 Jun 2017 06:44 )  PTT:78.9 sec  CARDIAC MARKERS ( 21 Jun 2017 01:48 )  x     / 7.64 ng/mL / 2833 U/L / x     / 154.8 ng/mL  CARDIAC MARKERS ( 20 Jun 2017 17:29 )  x     / 12.05 ng/mL / 3991 U/L / x     / 292.4 ng/mL          RADIOLOGY & ADDITIONAL TESTS:    Imaging Personally Reviewed:    Consultant(s) Notes Reviewed:      Care Discussed with Consultants/Other Providers:

## 2017-06-22 NOTE — PROGRESS NOTE ADULT - SUBJECTIVE AND OBJECTIVE BOX
Patient is a 76y old  Male who presents with a chief complaint of abdominal pain (20 Jun 2017 04:34)      SUBJECTIVE / OVERNIGHT EVENTS:  no complaints overnight, Had BM x 3 yesterday, no N/V/CP/SOB-all other ROS negative    MEDICATIONS  (STANDING):  aspirin  chewable 81milliGRAM(s) Oral daily  clopidogrel Tablet 75milliGRAM(s) Oral daily  atorvastatin 80milliGRAM(s) Oral at bedtime  carvedilol 3.125milliGRAM(s) Oral every 12 hours  lisinopril 5milliGRAM(s) Oral daily  heparin  Infusion. Unit(s)/Hr IV Continuous <Continuous>  polyethylene glycol 3350 17Gram(s) Oral daily  warfarin 5milliGRAM(s) Oral once    MEDICATIONS  (PRN):  heparin  Injectable 6500Unit(s) IV Push every 6 hours PRN For aPTT less than 40  heparin  Injectable 3000Unit(s) IV Push every 6 hours PRN For aPTT between 40 - 57      Vital Signs Last 24 Hrs  T(C): 36.6, Max: 36.9 (06-21 @ 19:35)  HR: 85 (70 - 85)  BP: 109/75 (102/58 - 156/94)  RR: 17 (15 - 27)  SpO2: 97% (95% - 98%)  Wt(kg): --  CAPILLARY BLOOD GLUCOSE    I&O's Summary  I & Os for 24h ending 22 Jun 2017 07:00  =============================================  IN: 1747 ml / OUT: 2003 ml / NET: -256 ml    I & Os for current day (as of 22 Jun 2017 10:30)  =============================================  IN: 0 ml / OUT: 220 ml / NET: -220 ml      PHYSICAL EXAM:  GENERAL: NAD, well-developed  EYES: PERRLA, conjunctiva and sclera clear  NECK: Supple, No JVD  CHEST/LUNG: Clear to auscultation bilaterally; No wheeze  HEART: Regular rate and rhythm; No murmurs, rubs, or gallops  ABDOMEN: Soft, Nontender, mild distension; Bowel sounds present  EXTREMITIES:  2+ Peripheral Pulses, No clubbing, cyanosis, or edema  SKIN: No rashes or lesions    LABS:                        12.8   7.9   )-----------( 175      ( 22 Jun 2017 06:42 )             39.6     06-22    140  |  103  |  9   ----------------------------<  107<H>  4.4   |  31  |  0.89    Ca    8.7      22 Jun 2017 06:42  Phos  3.0     06-22  Mg     2.0     06-22    TPro  6.6  /  Alb  3.3  /  TBili  0.8  /  DBili  x   /  AST  221<H>  /  ALT  48<H>  /  AlkPhos  52  06-21    PT/INR - ( 22 Jun 2017 06:44 )   PT: 12.0 sec;   INR: 1.10 ratio         PTT - ( 22 Jun 2017 06:44 )  PTT:78.9 sec  CARDIAC MARKERS ( 21 Jun 2017 01:48 )  x     / 7.64 ng/mL / 2833 U/L / x     / 154.8 ng/mL  CARDIAC MARKERS ( 20 Jun 2017 17:29 )  x     / 12.05 ng/mL / 3991 U/L / x     / 292.4 ng/mL          RADIOLOGY & ADDITIONAL TESTS:    Imaging Personally Reviewed:    Consultant(s) Notes Reviewed:      Care Discussed with Consultants/Other Providers:
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
## 2017-06-22 NOTE — PROGRESS NOTE ADULT - PROBLEM SELECTOR PLAN 2
-Found on Chest CT yesterday that was looking for chest mass (no chest mass visualized)  -CT A/P w/ IV contrast performed immediately-shows NO MASS, has sigmoid distension  -Redundant sigmoid colon with marked air distention. No swirling of   the mesentery. No obstructing mass. No sigmoid volvulus. patient has had 3 large BM yesterday, and one large one this AM, has flatulence  -continue to follow up GI recs  -f/u repeat Abd xray  - Clear liquid diet  - Miralax daily

## 2017-06-22 NOTE — PROGRESS NOTE ADULT - PROBLEM SELECTOR PLAN 2
-Found on Chest CT yesterday that was looking for chest mass (no chest mass visualized)  -CT A/P w/ IV contrast performed immediately  -Redundant sigmoid colon with marked air distention. No swirling of   the mesentery. No obstructing mass. No sigmoid volvulus.   -f/u  recommended saline enema as first line, which was attempted, but not successful.  -BM this AM with some relief  -f/u repeat Abd xray  - If still distended will need rectal tube for decompression.  - Clear liquid diet  - Miralax daily
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
## 2017-06-23 LAB
ANION GAP SERPL CALC-SCNC: 11 MMOL/L — SIGNIFICANT CHANGE UP (ref 5–17)
APTT BLD: 108.5 SEC — HIGH (ref 27.5–37.4)
APTT BLD: 46.2 SEC — HIGH (ref 27.5–37.4)
APTT BLD: 88.6 SEC — HIGH (ref 27.5–37.4)
BUN SERPL-MCNC: 8 MG/DL — SIGNIFICANT CHANGE UP (ref 7–23)
CALCIUM SERPL-MCNC: 8.6 MG/DL — SIGNIFICANT CHANGE UP (ref 8.4–10.5)
CHLORIDE SERPL-SCNC: 101 MMOL/L — SIGNIFICANT CHANGE UP (ref 96–108)
CO2 SERPL-SCNC: 27 MMOL/L — SIGNIFICANT CHANGE UP (ref 22–31)
CREAT SERPL-MCNC: 0.76 MG/DL — SIGNIFICANT CHANGE UP (ref 0.5–1.3)
GLUCOSE SERPL-MCNC: 105 MG/DL — HIGH (ref 70–99)
HCT VFR BLD CALC: 37.4 % — LOW (ref 39–50)
HGB BLD-MCNC: 12.5 G/DL — LOW (ref 13–17)
INR BLD: 1.3 RATIO — HIGH (ref 0.88–1.16)
MAGNESIUM SERPL-MCNC: 2 MG/DL — SIGNIFICANT CHANGE UP (ref 1.6–2.6)
MCHC RBC-ENTMCNC: 27.3 PG — SIGNIFICANT CHANGE UP (ref 27–34)
MCHC RBC-ENTMCNC: 33.4 GM/DL — SIGNIFICANT CHANGE UP (ref 32–36)
MCV RBC AUTO: 81.9 FL — SIGNIFICANT CHANGE UP (ref 80–100)
PHOSPHATE SERPL-MCNC: 3.6 MG/DL — SIGNIFICANT CHANGE UP (ref 2.5–4.5)
PLATELET # BLD AUTO: 179 K/UL — SIGNIFICANT CHANGE UP (ref 150–400)
POTASSIUM SERPL-MCNC: 3.9 MMOL/L — SIGNIFICANT CHANGE UP (ref 3.5–5.3)
POTASSIUM SERPL-SCNC: 3.9 MMOL/L — SIGNIFICANT CHANGE UP (ref 3.5–5.3)
PROTHROM AB SERPL-ACNC: 14.3 SEC — HIGH (ref 9.8–12.7)
RBC # BLD: 4.56 M/UL — SIGNIFICANT CHANGE UP (ref 4.2–5.8)
RBC # FLD: 13.7 % — SIGNIFICANT CHANGE UP (ref 10.3–14.5)
SODIUM SERPL-SCNC: 139 MMOL/L — SIGNIFICANT CHANGE UP (ref 135–145)
WBC # BLD: 8.8 K/UL — SIGNIFICANT CHANGE UP (ref 3.8–10.5)
WBC # FLD AUTO: 8.8 K/UL — SIGNIFICANT CHANGE UP (ref 3.8–10.5)

## 2017-06-23 PROCEDURE — 99233 SBSQ HOSP IP/OBS HIGH 50: CPT

## 2017-06-23 PROCEDURE — 74000: CPT | Mod: 26

## 2017-06-23 RX ORDER — WARFARIN SODIUM 2.5 MG/1
5 TABLET ORAL ONCE
Qty: 0 | Refills: 0 | Status: COMPLETED | OUTPATIENT
Start: 2017-06-23 | End: 2017-06-23

## 2017-06-23 RX ADMIN — HEPARIN SODIUM 1500 UNIT(S)/HR: 5000 INJECTION INTRAVENOUS; SUBCUTANEOUS at 14:58

## 2017-06-23 RX ADMIN — HEPARIN SODIUM 1500 UNIT(S)/HR: 5000 INJECTION INTRAVENOUS; SUBCUTANEOUS at 06:36

## 2017-06-23 RX ADMIN — Medication 81 MILLIGRAM(S): at 12:16

## 2017-06-23 RX ADMIN — CARVEDILOL PHOSPHATE 3.12 MILLIGRAM(S): 80 CAPSULE, EXTENDED RELEASE ORAL at 05:04

## 2017-06-23 RX ADMIN — HEPARIN SODIUM 3000 UNIT(S): 5000 INJECTION INTRAVENOUS; SUBCUTANEOUS at 21:33

## 2017-06-23 RX ADMIN — CARVEDILOL PHOSPHATE 3.12 MILLIGRAM(S): 80 CAPSULE, EXTENDED RELEASE ORAL at 18:28

## 2017-06-23 RX ADMIN — WARFARIN SODIUM 5 MILLIGRAM(S): 2.5 TABLET ORAL at 21:02

## 2017-06-23 RX ADMIN — ATORVASTATIN CALCIUM 80 MILLIGRAM(S): 80 TABLET, FILM COATED ORAL at 21:02

## 2017-06-23 RX ADMIN — LISINOPRIL 5 MILLIGRAM(S): 2.5 TABLET ORAL at 05:04

## 2017-06-23 RX ADMIN — HEPARIN SODIUM 1700 UNIT(S)/HR: 5000 INJECTION INTRAVENOUS; SUBCUTANEOUS at 21:29

## 2017-06-23 RX ADMIN — CLOPIDOGREL BISULFATE 75 MILLIGRAM(S): 75 TABLET, FILM COATED ORAL at 12:20

## 2017-06-23 NOTE — DISCHARGE NOTE ADULT - PLAN OF CARE
Please follow up with your cardiologist and take your medications as prescribed. You had a heart attack and had a stent placed. Please take your medications as prescribed. Please follow up with your cardiology to have a follow up echo of your heart in one month Please follow up with your General Physician within 1-2 weeks of discharge You had a pseudo obstruction of your colon because of your heart attack. It has now resolved. Please follow up with your General Physician within 1-2 weeks of discharge You were found to have a blood clot in your heart. you were started on a blood thinner to help dissolve the clot. Please follow up with your PCP Dr. FINCH on 6/28/17 @9:45 am to have your INR checked and medication dosed appropriately You had a pseudo obstruction of your colon because of your heart attack. It has now resolved. Please follow up with your PCP on 6/27/17 @9:45 AM. You will need to have a colonoscopy outpatient. You were found to have a blood clot in your heart. you were started on a blood thinner to help dissolve the clot. Please follow up with your PCP Dr. FINCH on 6/27/17 @9:45 am to have your INR checked and medication dosed appropriately

## 2017-06-23 NOTE — PROGRESS NOTE ADULT - PROBLEM SELECTOR PLAN 4
- clear liquid diet
-NPO for pseudoobstruction

## 2017-06-23 NOTE — DISCHARGE NOTE ADULT - PATIENT PORTAL LINK FT
“You can access the Follow[facility] Patient Portal, offered by [facility], by registering with the following website: [email or web address]”

## 2017-06-23 NOTE — PROGRESS NOTE ADULT - SUBJECTIVE AND OBJECTIVE BOX
Patient is a 76y old  Male who presents with a chief complaint of abdominal pain (20 Jun 2017 04:34)      SUBJECTIVE / OVERNIGHT EVENTS:  no complaints of CP, SOB, palpitations, fevers/chills, other ROS is negative  has had BM    MEDICATIONS  (STANDING):  aspirin  chewable 81milliGRAM(s) Oral daily  clopidogrel Tablet 75milliGRAM(s) Oral daily  atorvastatin 80milliGRAM(s) Oral at bedtime  carvedilol 3.125milliGRAM(s) Oral every 12 hours  lisinopril 5milliGRAM(s) Oral daily  heparin  Infusion. Unit(s)/Hr IV Continuous <Continuous>  polyethylene glycol 3350 17Gram(s) Oral daily  warfarin 5milliGRAM(s) Oral once    MEDICATIONS  (PRN):  heparin  Injectable 6500Unit(s) IV Push every 6 hours PRN For aPTT less than 40  heparin  Injectable 3000Unit(s) IV Push every 6 hours PRN For aPTT between 40 - 57      Vital Signs Last 24 Hrs  T(C): 36.8, Max: 37.1 (06-22 @ 20:41)  HR: 78 (73 - 78)  BP: 118/81 (118/81 - 135/77)  RR: 18 (18 - 18)  SpO2: 97% (97% - 99%)  Wt(kg): --  CAPILLARY BLOOD GLUCOSE    I&O's Summary    I & Os for current day (as of 23 Jun 2017 07:33)  =============================================  IN: 1160 ml / OUT: 1870 ml / NET: -710 ml      PHYSICAL EXAM:  GENERAL: NAD, well-developed  EYES: conjunctiva and sclera clear  NECK: Supple, No JVD  CHEST/LUNG: Clear to auscultation bilaterally; No wheeze  HEART: Regular rate and rhythm; No murmurs, rubs, or gallops  ABDOMEN: Soft, Nontender, Nondistended; Bowel sounds present  EXTREMITIES:   No clubbing, cyanosis, or edema  NEUROLOGY: non-focal  SKIN: No rashes or lesions    LABS:                        12.5   8.8   )-----------( 179      ( 23 Jun 2017 06:05 )             37.4     06-23    139  |  101  |  8   ----------------------------<  105<H>  3.9   |  27  |  0.76    Ca    8.6      23 Jun 2017 06:05  Phos  3.6     06-23  Mg     2.0     06-23      PT/INR - ( 23 Jun 2017 06:05 )   PT: 14.3 sec;   INR: 1.30 ratio         PTT - ( 23 Jun 2017 06:05 )  PTT:108.5 sec          RADIOLOGY & ADDITIONAL TESTS:    Imaging Personally Reviewed:    Consultant(s) Notes Reviewed:      Care Discussed with Consultants/Other Providers:

## 2017-06-23 NOTE — DISCHARGE NOTE ADULT - MEDICATION SUMMARY - MEDICATIONS TO TAKE
I will START or STAY ON the medications listed below when I get home from the hospital:    aspirin 81 mg oral tablet, chewable  -- 1 tab(s) by mouth once a day  -- Indication: For CAD    lisinopril 5 mg oral tablet  -- 1 tab(s) by mouth once a day  -- Indication: For CAD    atorvastatin 80 mg oral tablet  -- 1 tab(s) by mouth once a day (at bedtime)  -- Indication: For CAD    clopidogrel 75 mg oral tablet  -- 1 tab(s) by mouth once a day  -- Indication: For CAD    carvedilol 3.125 mg oral tablet  -- 1 tab(s) by mouth every 12 hours  -- Indication: For CAD    polyethylene glycol 3350 oral powder for reconstitution  -- 17 gram(s) by mouth once a day  -- Indication: For Colonic pseudoobstruction I will START or STAY ON the medications listed below when I get home from the hospital:    aspirin 81 mg oral tablet, chewable  -- 1 tab(s) by mouth once a day  -- Indication: For CAD    lisinopril 5 mg oral tablet  -- 1 tab(s) by mouth once a day  -- Indication: For CAD    atorvastatin 80 mg oral tablet  -- 1 tab(s) by mouth once a day (at bedtime)  -- Indication: For CAD    clopidogrel 75 mg oral tablet  -- 1 tab(s) by mouth once a day  -- Indication: For CAD    carvedilol 3.125 mg oral tablet  -- 1 tab(s) by mouth every 12 hours  -- Indication: For CAD    polyethylene glycol 3350 oral powder for reconstitution  -- 17 gram(s) by mouth once a day  -- Indication: For CAD

## 2017-06-23 NOTE — PROGRESS NOTE ADULT - PROBLEM SELECTOR PROBLEM 4
Nutritional assessment

## 2017-06-23 NOTE — DISCHARGE NOTE ADULT - CARE PLAN
Principal Discharge DX:	ST elevation myocardial infarction (STEMI), unspecified artery  Goal:	Please follow up with your cardiologist and take your medications as prescribed.  Instructions for follow-up, activity and diet:	You had a heart attack and had a stent placed. Please take your medications as prescribed. Please follow up with your cardiology to have a follow up echo of your heart in one month  Secondary Diagnosis:	Colonic pseudoobstruction  Goal:	Please follow up with your General Physician within 1-2 weeks of discharge  Instructions for follow-up, activity and diet:	You had a pseudo obstruction of your colon because of your heart attack. It has now resolved. Please follow up with your General Physician within 1-2 weeks of discharge Principal Discharge DX:	ST elevation myocardial infarction (STEMI), unspecified artery  Goal:	Please follow up with your cardiologist and take your medications as prescribed.  Instructions for follow-up, activity and diet:	You had a heart attack and had a stent placed. Please take your medications as prescribed. Please follow up with your cardiology to have a follow up echo of your heart in one month  Secondary Diagnosis:	Colonic pseudoobstruction  Goal:	Please follow up with your General Physician within 1-2 weeks of discharge  Instructions for follow-up, activity and diet:	You had a pseudo obstruction of your colon because of your heart attack. It has now resolved. Please follow up with your General Physician within 1-2 weeks of discharge  Secondary Diagnosis:	Thrombus  Instructions for follow-up, activity and diet:	You were found to have a blood clot in your heart. you were started on a blood thinner to help dissolve the clot. Please follow up with your PCP Dr. FINCH on 6/28/17 @9:45 am to have your INR checked and medication dosed appropriately Principal Discharge DX:	ST elevation myocardial infarction (STEMI), unspecified artery  Goal:	Please follow up with your cardiologist and take your medications as prescribed.  Instructions for follow-up, activity and diet:	You had a heart attack and had a stent placed. Please take your medications as prescribed. Please follow up with your cardiology to have a follow up echo of your heart in one month  Secondary Diagnosis:	Colonic pseudoobstruction  Goal:	Please follow up with your General Physician within 1-2 weeks of discharge  Instructions for follow-up, activity and diet:	You had a pseudo obstruction of your colon because of your heart attack. It has now resolved. Please follow up with your PCP on 6/27/17 @9:45 AM. You will need to have a colonoscopy outpatient.  Secondary Diagnosis:	Thrombus  Instructions for follow-up, activity and diet:	You were found to have a blood clot in your heart. you were started on a blood thinner to help dissolve the clot. Please follow up with your PCP Dr. FINCH on 6/27/17 @9:45 am to have your INR checked and medication dosed appropriately

## 2017-06-23 NOTE — DISCHARGE NOTE ADULT - HOSPITAL COURSE
76 M with no active medical hx who initially presented to [facility] with abdominal pain found to have ST-elevations in leads V1-V6 and II, III, and AVF and subsequently transferred to [facility] for urgent cath.   Patient was transfered to CCU and had cath, receiving a bare metal stent to the proximal LAD (% occluded). He received a BMS instead of a drug eluting stent because CXR at OSH had findings concerning for chest mass, and BMS are favored in setting of cancer. TTE showed EF 30-35% w/ mod-severe seg LV systolic dysfunction w/ anteroseptal and inferoseptal walls hypokinetic and the apical cap is akinetic. No LV thrombus seen, but swirling contrast suggests low flow state. Pt was started on hep gtt w/ bridge to coumadin.    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 GI consulted and recommending serial belly exams, miralax, serial abdominal xrays. Patient now on clear liquid diet, and has been having BMs.    Patient to follow up with cardiology outpatient for repeat echo in one month 76 M with no active medical hx who initially presented to [facility] with abdominal pain found to have ST-elevations in leads V1-V6 and II, III, and AVF and subsequently transferred to [facility] for urgent cath.   Patient was transfered to CCU and had cath, receiving a bare metal stent to the proximal LAD (% occluded). He received a BMS instead of a drug eluting stent because CXR at OSH had findings concerning for chest mass, and BMS are favored in setting of cancer. TTE showed EF 30-35% w/ mod-severe seg LV systolic dysfunction w/ anteroseptal and inferoseptal walls hypokinetic and the apical cap is akinetic. No LV thrombus seen, but swirling contrast suggests low flow state. Pt was started on hep gtt w/ bridge to coumadin. On the day of discharge pt's INR was therepeutic at 2.55. 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 GI consulted and recommending serial belly exams, miralax, serial abdominal xrays. Patient now on clear liquid diet, and has been having BMs.    Patient to follow up with his cardiology outpatient for repeat echo in one month 76 M with no active medical hx who initially presented [facility] with abdominal pain found to have ST-elevations in leads V1-V6 and II, III, and AVF and subsequently transferred to [facility] for urgent cath.   Patient was transfered to CCU and had cath, receiving a bare metal stent to the proximal LAD (% occluded). He received a BMS instead of a drug eluting stent because CXR at OSH had findings concerning for chest mass, and BMS are favored in setting of cancer. TTE showed EF 30-35% w/ mod-severe seg LV systolic dysfunction w/ anteroseptal and inferoseptal walls hypokinetic and the apical cap is akinetic. No LV thrombus seen, but swirling contrast suggests low flow state. Pt was started on hep gtt w/ bridge to coumadin. On the day of discharge pt's INR was therepeutic at 2.55. He was given Coumadin 5,5,5,10. Coumadin was not given outpatient for fear that his INR would jump too dramatically. Pt will follo with his PCP Dr. 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 GI consulted and recommending serial belly exams, miralax, serial abdominal xrays. Patient now on clear liquid diet, and has been having BMs.Pt will need colonoscopy outpatient and GI follow up    Patient to follow up with his cardiology outpatient for repeat echo in one month
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
## 2017-06-23 NOTE — PROGRESS NOTE ADULT - PROBLEM SELECTOR PLAN 5
-Heparin gtt in setting of apical ballooning post MI.  - dosed coumadin  - trend pt/inr/ptt
-Heparin gtt in setting of apical ballooning post MI.  - starting coumadin tonight  - trend pt/inr/ptt
-Heparin gtt in setting of apical ballooning post MI.

## 2017-06-24 LAB
ANION GAP SERPL CALC-SCNC: 11 MMOL/L — SIGNIFICANT CHANGE UP (ref 5–17)
APTT BLD: 120 SEC — CRITICAL HIGH (ref 27.5–37.4)
APTT BLD: 32.8 SEC — SIGNIFICANT CHANGE UP (ref 27.5–37.4)
APTT BLD: 46.8 SEC — HIGH (ref 27.5–37.4)
BASOPHILS # BLD AUTO: 0 K/UL — SIGNIFICANT CHANGE UP (ref 0–0.2)
BASOPHILS NFR BLD AUTO: 0.4 % — SIGNIFICANT CHANGE UP (ref 0–2)
BUN SERPL-MCNC: 7 MG/DL — SIGNIFICANT CHANGE UP (ref 7–23)
CALCIUM SERPL-MCNC: 8.5 MG/DL — SIGNIFICANT CHANGE UP (ref 8.4–10.5)
CHLORIDE SERPL-SCNC: 102 MMOL/L — SIGNIFICANT CHANGE UP (ref 96–108)
CO2 SERPL-SCNC: 25 MMOL/L — SIGNIFICANT CHANGE UP (ref 22–31)
CREAT SERPL-MCNC: 0.73 MG/DL — SIGNIFICANT CHANGE UP (ref 0.5–1.3)
EOSINOPHIL # BLD AUTO: 0.1 K/UL — SIGNIFICANT CHANGE UP (ref 0–0.5)
EOSINOPHIL NFR BLD AUTO: 1.7 % — SIGNIFICANT CHANGE UP (ref 0–6)
GLUCOSE SERPL-MCNC: 111 MG/DL — HIGH (ref 70–99)
HCT VFR BLD CALC: 38.3 % — LOW (ref 39–50)
HGB BLD-MCNC: 12.5 G/DL — LOW (ref 13–17)
INR BLD: 1.38 RATIO — HIGH (ref 0.88–1.16)
LYMPHOCYTES # BLD AUTO: 3.1 K/UL — SIGNIFICANT CHANGE UP (ref 1–3.3)
LYMPHOCYTES # BLD AUTO: 40.4 % — SIGNIFICANT CHANGE UP (ref 13–44)
MAGNESIUM SERPL-MCNC: 1.9 MG/DL — SIGNIFICANT CHANGE UP (ref 1.6–2.6)
MCHC RBC-ENTMCNC: 26.5 PG — LOW (ref 27–34)
MCHC RBC-ENTMCNC: 32.7 GM/DL — SIGNIFICANT CHANGE UP (ref 32–36)
MCV RBC AUTO: 81.1 FL — SIGNIFICANT CHANGE UP (ref 80–100)
MONOCYTES # BLD AUTO: 1.2 K/UL — HIGH (ref 0–0.9)
MONOCYTES NFR BLD AUTO: 15.7 % — HIGH (ref 2–14)
NEUTROPHILS # BLD AUTO: 3.2 K/UL — SIGNIFICANT CHANGE UP (ref 1.8–7.4)
NEUTROPHILS NFR BLD AUTO: 41.7 % — LOW (ref 43–77)
PHOSPHATE SERPL-MCNC: 3.5 MG/DL — SIGNIFICANT CHANGE UP (ref 2.5–4.5)
PLATELET # BLD AUTO: 178 K/UL — SIGNIFICANT CHANGE UP (ref 150–400)
POTASSIUM SERPL-MCNC: 3.9 MMOL/L — SIGNIFICANT CHANGE UP (ref 3.5–5.3)
POTASSIUM SERPL-SCNC: 3.9 MMOL/L — SIGNIFICANT CHANGE UP (ref 3.5–5.3)
PROTHROM AB SERPL-ACNC: 15 SEC — HIGH (ref 9.8–12.7)
RBC # BLD: 4.73 M/UL — SIGNIFICANT CHANGE UP (ref 4.2–5.8)
RBC # FLD: 13.6 % — SIGNIFICANT CHANGE UP (ref 10.3–14.5)
SODIUM SERPL-SCNC: 138 MMOL/L — SIGNIFICANT CHANGE UP (ref 135–145)
WBC # BLD: 7.6 K/UL — SIGNIFICANT CHANGE UP (ref 3.8–10.5)
WBC # FLD AUTO: 7.6 K/UL — SIGNIFICANT CHANGE UP (ref 3.8–10.5)

## 2017-06-24 PROCEDURE — 99233 SBSQ HOSP IP/OBS HIGH 50: CPT

## 2017-06-24 PROCEDURE — 74000: CPT | Mod: 26

## 2017-06-24 RX ORDER — WARFARIN SODIUM 2.5 MG/1
10 TABLET ORAL ONCE
Qty: 0 | Refills: 0 | Status: COMPLETED | OUTPATIENT
Start: 2017-06-24 | End: 2017-06-24

## 2017-06-24 RX ORDER — WARFARIN SODIUM 2.5 MG/1
7.5 TABLET ORAL ONCE
Qty: 0 | Refills: 0 | Status: DISCONTINUED | OUTPATIENT
Start: 2017-06-24 | End: 2017-06-24

## 2017-06-24 RX ADMIN — ATORVASTATIN CALCIUM 80 MILLIGRAM(S): 80 TABLET, FILM COATED ORAL at 21:53

## 2017-06-24 RX ADMIN — CARVEDILOL PHOSPHATE 3.12 MILLIGRAM(S): 80 CAPSULE, EXTENDED RELEASE ORAL at 17:16

## 2017-06-24 RX ADMIN — HEPARIN SODIUM 1900 UNIT(S)/HR: 5000 INJECTION INTRAVENOUS; SUBCUTANEOUS at 11:33

## 2017-06-24 RX ADMIN — CLOPIDOGREL BISULFATE 75 MILLIGRAM(S): 75 TABLET, FILM COATED ORAL at 11:32

## 2017-06-24 RX ADMIN — HEPARIN SODIUM 3000 UNIT(S): 5000 INJECTION INTRAVENOUS; SUBCUTANEOUS at 18:22

## 2017-06-24 RX ADMIN — LISINOPRIL 5 MILLIGRAM(S): 2.5 TABLET ORAL at 05:13

## 2017-06-24 RX ADMIN — HEPARIN SODIUM 1500 UNIT(S)/HR: 5000 INJECTION INTRAVENOUS; SUBCUTANEOUS at 03:58

## 2017-06-24 RX ADMIN — CARVEDILOL PHOSPHATE 3.12 MILLIGRAM(S): 80 CAPSULE, EXTENDED RELEASE ORAL at 05:13

## 2017-06-24 RX ADMIN — HEPARIN SODIUM 6500 UNIT(S): 5000 INJECTION INTRAVENOUS; SUBCUTANEOUS at 11:37

## 2017-06-24 RX ADMIN — HEPARIN SODIUM 2100 UNIT(S)/HR: 5000 INJECTION INTRAVENOUS; SUBCUTANEOUS at 18:17

## 2017-06-24 RX ADMIN — WARFARIN SODIUM 10 MILLIGRAM(S): 2.5 TABLET ORAL at 21:53

## 2017-06-24 RX ADMIN — Medication 81 MILLIGRAM(S): at 11:32

## 2017-06-24 NOTE — PROGRESS NOTE ADULT - PROBLEM SELECTOR PLAN 2
- Pt having normal BM, arnaldo reg diet, but colonic pseudoobstruction still visible on AXR  - Miralax daily, will need outpt colonoscopy - Pt having normal BM, arnaldo CLD, but colonic pseudoobstruction still visible on AXR  - Will adv diet today  - Miralax daily, will need outpt colonoscopy

## 2017-06-24 NOTE — PROGRESS NOTE ADULT - SUBJECTIVE AND OBJECTIVE BOX
SUBJECTIVE / OVERNIGHT EVENTS:  Pt seen and examined at bedside, no major events o/n. Had BM this AM    MEDICATIONS  (STANDING):  aspirin  chewable 81milliGRAM(s) Oral daily  clopidogrel Tablet 75milliGRAM(s) Oral daily  atorvastatin 80milliGRAM(s) Oral at bedtime  carvedilol 3.125milliGRAM(s) Oral every 12 hours  lisinopril 5milliGRAM(s) Oral daily  heparin  Infusion. Unit(s)/Hr IV Continuous <Continuous>  polyethylene glycol 3350 17Gram(s) Oral daily    MEDICATIONS  (PRN):  heparin  Injectable 6500Unit(s) IV Push every 6 hours PRN For aPTT less than 40  heparin  Injectable 3000Unit(s) IV Push every 6 hours PRN For aPTT between 40 - 57      Vital Signs Last 24 Hrs  T(C): 36.3, Max: 36.8 (06-23 @ 20:34)  HR: 72 (72 - 85)  BP: 116/57 (111/62 - 127/72)  RR: 18 (18 - 20)  SpO2: 96% (96% - 99%)  Wt(kg): --  CAPILLARY BLOOD GLUCOSE    I&O's Summary  I & Os for 24h ending 24 Jun 2017 07:00  =============================================  IN: 1688 ml / OUT: 2150 ml / NET: -462 ml    I & Os for current day (as of 24 Jun 2017 12:15)  =============================================  IN: 300 ml / OUT: 300 ml / NET: 0 ml      PHYSICAL EXAM  GENERAL: NAD, well-developed  HEAD:  Atraumatic, Normocephalic  EYES: EOMI, PERRLA, conjunctiva and sclera clear  NECK: Supple, No JVD  CHEST/LUNG: Clear to auscultation bilaterally; No wheeze  HEART: Regular rate and rhythm; No murmurs, rubs, or gallops  ABDOMEN: Soft, Nontender, Nondistended; Bowel sounds present  EXTREMITIES:  2+ Peripheral Pulses, No clubbing, cyanosis, or edema  PSYCH: AAOx3  SKIN: No rashes or lesions    LABS:                        12.5   7.6   )-----------( 178      ( 24 Jun 2017 04:25 )             38.3     06-24    138  |  102  |  7   ----------------------------<  111<H>  3.9   |  25  |  0.73    Ca    8.5      24 Jun 2017 04:25  Phos  3.5     06-24  Mg     1.9     06-24      PT/INR - ( 24 Jun 2017 04:25 )   PT: 15.0 sec;   INR: 1.38 ratio         PTT - ( 24 Jun 2017 10:25 )  PTT:32.8 sec          RADIOLOGY & ADDITIONAL TESTS:    Imaging Personally Reviewed: AXR shows persistent massive sigmoid colon dilation  Consultant(s) Notes Reviewed:    Care Discussed with Consultants/Other Providers: SUBJECTIVE / OVERNIGHT EVENTS:  Pt seen and examined at bedside, no major events o/n. Had BM this AM, arnaldo CLD, req adv diet    MEDICATIONS  (STANDING):  aspirin  chewable 81milliGRAM(s) Oral daily  clopidogrel Tablet 75milliGRAM(s) Oral daily  atorvastatin 80milliGRAM(s) Oral at bedtime  carvedilol 3.125milliGRAM(s) Oral every 12 hours  lisinopril 5milliGRAM(s) Oral daily  heparin  Infusion. Unit(s)/Hr IV Continuous <Continuous>  polyethylene glycol 3350 17Gram(s) Oral daily    MEDICATIONS  (PRN):  heparin  Injectable 6500Unit(s) IV Push every 6 hours PRN For aPTT less than 40  heparin  Injectable 3000Unit(s) IV Push every 6 hours PRN For aPTT between 40 - 57      Vital Signs Last 24 Hrs  T(C): 36.3, Max: 36.8 (06-23 @ 20:34)  HR: 72 (72 - 85)  BP: 116/57 (111/62 - 127/72)  RR: 18 (18 - 20)  SpO2: 96% (96% - 99%)  Wt(kg): --  CAPILLARY BLOOD GLUCOSE    I&O's Summary  I & Os for 24h ending 24 Jun 2017 07:00  =============================================  IN: 1688 ml / OUT: 2150 ml / NET: -462 ml    I & Os for current day (as of 24 Jun 2017 12:15)  =============================================  IN: 300 ml / OUT: 300 ml / NET: 0 ml      PHYSICAL EXAM  GENERAL: NAD, well-developed  HEAD:  Atraumatic, Normocephalic  EYES: EOMI, PERRLA, conjunctiva and sclera clear  NECK: Supple, No JVD  CHEST/LUNG: Clear to auscultation bilaterally; No wheeze  HEART: Regular rate and rhythm; No murmurs, rubs, or gallops  ABDOMEN: Soft, Nontender, Nondistended; Bowel sounds present  EXTREMITIES:  2+ Peripheral Pulses, No clubbing, cyanosis, or edema  PSYCH: AAOx3  SKIN: No rashes or lesions    LABS:                        12.5   7.6   )-----------( 178      ( 24 Jun 2017 04:25 )             38.3     06-24    138  |  102  |  7   ----------------------------<  111<H>  3.9   |  25  |  0.73    Ca    8.5      24 Jun 2017 04:25  Phos  3.5     06-24  Mg     1.9     06-24      PT/INR - ( 24 Jun 2017 04:25 )   PT: 15.0 sec;   INR: 1.38 ratio         PTT - ( 24 Jun 2017 10:25 )  PTT:32.8 sec          RADIOLOGY & ADDITIONAL TESTS:    Imaging Personally Reviewed: AXR shows persistent massive sigmoid colon dilation  Consultant(s) Notes Reviewed:    Care Discussed with Consultants/Other Providers:

## 2017-06-25 VITALS
RESPIRATION RATE: 18 BRPM | OXYGEN SATURATION: 96 % | DIASTOLIC BLOOD PRESSURE: 73 MMHG | HEART RATE: 72 BPM | TEMPERATURE: 97 F | SYSTOLIC BLOOD PRESSURE: 131 MMHG

## 2017-06-25 LAB
ANION GAP SERPL CALC-SCNC: 9 MMOL/L — SIGNIFICANT CHANGE UP (ref 5–17)
APTT BLD: 90.2 SEC — HIGH (ref 27.5–37.4)
APTT BLD: > 200 SEC (ref 27.5–37.4)
BASOPHILS # BLD AUTO: 0 K/UL — SIGNIFICANT CHANGE UP (ref 0–0.2)
BASOPHILS NFR BLD AUTO: 0.1 % — SIGNIFICANT CHANGE UP (ref 0–2)
BUN SERPL-MCNC: 7 MG/DL — SIGNIFICANT CHANGE UP (ref 7–23)
CALCIUM SERPL-MCNC: 8.4 MG/DL — SIGNIFICANT CHANGE UP (ref 8.4–10.5)
CHLORIDE SERPL-SCNC: 103 MMOL/L — SIGNIFICANT CHANGE UP (ref 96–108)
CO2 SERPL-SCNC: 24 MMOL/L — SIGNIFICANT CHANGE UP (ref 22–31)
CREAT SERPL-MCNC: 0.72 MG/DL — SIGNIFICANT CHANGE UP (ref 0.5–1.3)
EOSINOPHIL # BLD AUTO: 0.2 K/UL — SIGNIFICANT CHANGE UP (ref 0–0.5)
EOSINOPHIL NFR BLD AUTO: 2.2 % — SIGNIFICANT CHANGE UP (ref 0–6)
GLUCOSE SERPL-MCNC: 110 MG/DL — HIGH (ref 70–99)
HCT VFR BLD CALC: 38.6 % — LOW (ref 39–50)
HGB BLD-MCNC: 12.6 G/DL — LOW (ref 13–17)
INR BLD: 2.55 RATIO — HIGH (ref 0.88–1.16)
LYMPHOCYTES # BLD AUTO: 2.7 K/UL — SIGNIFICANT CHANGE UP (ref 1–3.3)
LYMPHOCYTES # BLD AUTO: 38.7 % — SIGNIFICANT CHANGE UP (ref 13–44)
MAGNESIUM SERPL-MCNC: 2.1 MG/DL — SIGNIFICANT CHANGE UP (ref 1.6–2.6)
MCHC RBC-ENTMCNC: 26.4 PG — LOW (ref 27–34)
MCHC RBC-ENTMCNC: 32.5 GM/DL — SIGNIFICANT CHANGE UP (ref 32–36)
MCV RBC AUTO: 81 FL — SIGNIFICANT CHANGE UP (ref 80–100)
MONOCYTES # BLD AUTO: 0.9 K/UL — SIGNIFICANT CHANGE UP (ref 0–0.9)
MONOCYTES NFR BLD AUTO: 13.3 % — SIGNIFICANT CHANGE UP (ref 2–14)
NEUTROPHILS # BLD AUTO: 3.2 K/UL — SIGNIFICANT CHANGE UP (ref 1.8–7.4)
NEUTROPHILS NFR BLD AUTO: 45.6 % — SIGNIFICANT CHANGE UP (ref 43–77)
PHOSPHATE SERPL-MCNC: 3.4 MG/DL — SIGNIFICANT CHANGE UP (ref 2.5–4.5)
PLATELET # BLD AUTO: 180 K/UL — SIGNIFICANT CHANGE UP (ref 150–400)
POTASSIUM SERPL-MCNC: 3.8 MMOL/L — SIGNIFICANT CHANGE UP (ref 3.5–5.3)
POTASSIUM SERPL-SCNC: 3.8 MMOL/L — SIGNIFICANT CHANGE UP (ref 3.5–5.3)
PROTHROM AB SERPL-ACNC: 28.3 SEC — HIGH (ref 9.8–12.7)
RBC # BLD: 4.77 M/UL — SIGNIFICANT CHANGE UP (ref 4.2–5.8)
RBC # FLD: 13.4 % — SIGNIFICANT CHANGE UP (ref 10.3–14.5)
SODIUM SERPL-SCNC: 136 MMOL/L — SIGNIFICANT CHANGE UP (ref 135–145)
WBC # BLD: 6.9 K/UL — SIGNIFICANT CHANGE UP (ref 3.8–10.5)
WBC # FLD AUTO: 6.9 K/UL — SIGNIFICANT CHANGE UP (ref 3.8–10.5)

## 2017-06-25 PROCEDURE — 99239 HOSP IP/OBS DSCHRG MGMT >30: CPT

## 2017-06-25 RX ORDER — ASPIRIN/CALCIUM CARB/MAGNESIUM 324 MG
1 TABLET ORAL
Qty: 30 | Refills: 0 | OUTPATIENT
Start: 2017-06-25 | End: 2017-07-25

## 2017-06-25 RX ORDER — CARVEDILOL PHOSPHATE 80 MG/1
1 CAPSULE, EXTENDED RELEASE ORAL
Qty: 60 | Refills: 0 | OUTPATIENT
Start: 2017-06-25 | End: 2017-07-25

## 2017-06-25 RX ORDER — LISINOPRIL 2.5 MG/1
1 TABLET ORAL
Qty: 30 | Refills: 0 | OUTPATIENT
Start: 2017-06-25 | End: 2017-07-25

## 2017-06-25 RX ORDER — ATORVASTATIN CALCIUM 80 MG/1
1 TABLET, FILM COATED ORAL
Qty: 30 | Refills: 0 | OUTPATIENT
Start: 2017-06-25 | End: 2017-07-25

## 2017-06-25 RX ORDER — POLYETHYLENE GLYCOL 3350 17 G/17G
17 POWDER, FOR SOLUTION ORAL
Qty: 510 | Refills: 0 | OUTPATIENT
Start: 2017-06-25 | End: 2017-07-25

## 2017-06-25 RX ORDER — CLOPIDOGREL BISULFATE 75 MG/1
1 TABLET, FILM COATED ORAL
Qty: 30 | Refills: 0 | OUTPATIENT
Start: 2017-06-25 | End: 2017-07-25

## 2017-06-25 RX ORDER — ASPIRIN/CALCIUM CARB/MAGNESIUM 324 MG
1 TABLET ORAL
Qty: 0 | Refills: 0 | COMMUNITY

## 2017-06-25 RX ADMIN — CARVEDILOL PHOSPHATE 3.12 MILLIGRAM(S): 80 CAPSULE, EXTENDED RELEASE ORAL at 05:47

## 2017-06-25 RX ADMIN — Medication 81 MILLIGRAM(S): at 12:05

## 2017-06-25 RX ADMIN — CLOPIDOGREL BISULFATE 75 MILLIGRAM(S): 75 TABLET, FILM COATED ORAL at 12:03

## 2017-06-25 RX ADMIN — LISINOPRIL 5 MILLIGRAM(S): 2.5 TABLET ORAL at 05:47

## 2017-06-25 RX ADMIN — HEPARIN SODIUM 2100 UNIT(S)/HR: 5000 INJECTION INTRAVENOUS; SUBCUTANEOUS at 00:38

## 2017-06-25 NOTE — PROVIDER CONTACT NOTE (CRITICAL VALUE NOTIFICATION) - ASSESSMENT
AO4  stable condition and in no distress, no active bleeding noted. VSS. afebrile. On tele without events. No sob, no palpitations, denies cp.
Pt A&Ox3, VSS, denies pain, sob, discomfort.

## 2017-06-25 NOTE — PROVIDER CONTACT NOTE (CRITICAL VALUE NOTIFICATION) - BACKGROUND
AO4  Dx NSTEMI  s/p cath via right groin 6/20  MILEY+ x3  sigmoid distention- multiple BMs/ pending xray abd for this AM r/o obstruction.   continuous heparin gtt/ coumadin 5mg admin 6/23 INR 1.30
Pt admitted for ST Elevation/ stat cath

## 2017-06-25 NOTE — PROVIDER CONTACT NOTE (CRITICAL VALUE NOTIFICATION) - RECOMMENDATIONS
MD made aware ,follow nomogram protocol decrease rate from 17ml/hr to 15ml/hr. next aPTT collection for today at 0929.
Notify MD/NP/PA. Follow Full Anticoagulant Heparin Nomogram. Continue to monitor. Bleeding precautions.

## 2017-06-25 NOTE — PROGRESS NOTE ADULT - PROVIDER SPECIALTY LIST ADULT
CCU
CCU
Cardiology
Cardiology
Gastroenterology
Internal Medicine
CCU

## 2017-06-25 NOTE — PROGRESS NOTE ADULT - PROBLEM SELECTOR PLAN 1
-s/p urgent cath; loaded with aspirin/plavix prior.  -c/w ASA/plavix  -c/w carvedilol 3.125mg TID; increase as tolerated  -c/w lisinopril 5mg  -c/w lipitor 80mg for now; consider change given increase in LFTs this AM; trend CMP  -continue to trend cardiac enzymes. Enzymes peaked overnight  -TTE showing Moderate-severe segmental left ventricular systolic  dysfunction.The anteroseptal and inferoseptal walls are  hypokinetic and the apical cap is akinetic.  No left  ventricular thrombus seen, however swirling contrast  suggests very low flow state (cannot rule out small  thrombus).  -c/w heparin drip given risk for LV thrombus  -EF 30-35%; repeat echo as outpatient
- s/p PCI w/BMS to 100% pLAD, no further ACS sx, CE peaked  - c/w DAPT, lipitor, coreg, lisinopril (inc as arnaldo)  - TTE w/moderate-severe segmental LVSD (30-35%), w/hypokinesis of the anteroseptal and inferoseptal walls and akinesis of the apex, as well LV smoke concerning for low flow state/possible thrombus  - Pt now therapeutic on coumadin  - EF 30-35%; repeat echo as outpatient in one month on optimal medical therapy to eval for possible AICD for primary prevention
- s/p PCI w/BMS to 100% pLAD, no further ACS sx, CE peaked  - c/w DAPT, lipitor, coreg, lisinopril (inc as arnaldo)  - TTE w/moderate-severe segmental LVSD (30-35%), w/hypokinesis of the anteroseptal and inferoseptal walls and akinesis of the apex, as well LV smoke concerning for low flow state/possible thrombus  - c/w heparin to coumadin bridge  - EF 30-35%; repeat echo as outpatient in one month on optimal medical therapy to eval for possible AICD for primary prevention
-s/p urgent cath; loaded with aspirin/plavix prior.  -c/w ASA/plavix daily  -c/w carvedilol 3.125mg BID; increase as tolerated  -c/w lisinopril 5mg  -c/w lipitor 80mg despite slight increase in LFTs today  -Enzymes peaked overnight  -TTE showing Moderate-severe segmental left ventricular systolic  dysfunction.The anteroseptal and inferoseptal walls are  hypokinetic and the apical cap is akinetic.  No left  ventricular thrombus seen, however swirling contrast  suggests very low flow state (cannot rule out small  thrombus).  -c/w heparin drip given risk for LV thrombus, dosed coumadin, will dose approp  -EF 30-35%; repeat echo as outpatient in one month
-s/p urgent cath; loaded with aspirin/plavix prior.  -c/w ASA/plavix daily  -c/w carvedilol 3.125mg BID; will increase as tolerated  -c/w lisinopril 5mg  -c/w lipitor 80mg despite slight increase in LFTs today  -continue to trend cardiac enzymes. Enzymes peaked overnight  -TTE showing Moderate-severe segmental left ventricular systolic  dysfunction.The anteroseptal and inferoseptal walls are  hypokinetic and the apical cap is akinetic.  No left  ventricular thrombus seen, however swirling contrast  suggests very low flow state (cannot rule out small  thrombus).  -c/w heparin drip given risk for LV thrombus, coumadin dose for tonight  -EF 30-35%; repeat echo as outpatient
-s/p urgent cath; loaded with aspirin/plavix prior.  -c/w ASA/plavix daily  -c/w carvedilol 3.125mg TID; increase as tolerated  -c/w lisinopril 5mg  -c/w lipitor 80mg despite slight increase in LFTs today  -Enzymes peaked overnight  -TTE showing Moderate-severe segmental left ventricular systolic  dysfunction.The anteroseptal and inferoseptal walls are  hypokinetic and the apical cap is akinetic.  No left  ventricular thrombus seen, however swirling contrast  suggests very low flow state (cannot rule out small  thrombus).  -c/w heparin drip given risk for LV thrombus, will dose coumadin tonight  -EF 30-35%; repeat echo as outpatient in one month
-s/p urgent cath; loaded with aspirin/plavix prior.  -c/w ASA/plavix daily  -c/w carvedilol 3.125mg TID; increase as tolerated  -c/w lisinopril 5mg  -c/w lipitor 80mg despite slight increase in LFTs today  -continue to trend cardiac enzymes. Enzymes peaked overnight  -TTE showing Moderate-severe segmental left ventricular systolic  dysfunction.The anteroseptal and inferoseptal walls are  hypokinetic and the apical cap is akinetic.  No left  ventricular thrombus seen, however swirling contrast  suggests very low flow state (cannot rule out small  thrombus).  -c/w heparin drip given risk for LV thrombus  -EF 30-35%; repeat echo as outpatient

## 2017-06-25 NOTE — PROGRESS NOTE ADULT - SUBJECTIVE AND OBJECTIVE BOX
Patient is a 76y old  Male who presents with a chief complaint of abdominal pain (23 Jun 2017 14:37)        SUBJECTIVE / OVERNIGHT EVENTS: No overnight events. Pt states he feels well. Denies abdominal pain, bloating. States he has been having regular bowel movements. Denies fever, chills, nausea, vomiting, chest pain, or shortness of breath.       MEDICATIONS  (STANDING):  aspirin  chewable 81milliGRAM(s) Oral daily  clopidogrel Tablet 75milliGRAM(s) Oral daily  atorvastatin 80milliGRAM(s) Oral at bedtime  carvedilol 3.125milliGRAM(s) Oral every 12 hours  lisinopril 5milliGRAM(s) Oral daily  polyethylene glycol 3350 17Gram(s) Oral daily    MEDICATIONS  (PRN):      Vital Signs Last 24 Hrs  T(C): 36.3, Max: 37.2 (06-24 @ 21:31)  HR: 72 (72 - 77)  BP: 131/73 (127/75 - 135/81)  RR: 18 (18 - 18)  SpO2: 96% (96% - 98%)  Wt(kg): --  CAPILLARY BLOOD GLUCOSE    I&O's Summary  I & Os for 24h ending 25 Jun 2017 07:00  =============================================  IN: 1510 ml / OUT: 2000 ml / NET: -490 ml    I & Os for current day (as of 25 Jun 2017 09:46)  =============================================  IN: 300 ml / OUT: 300 ml / NET: 0 ml      PHYSICAL EXAM  GENERAL: NAD, well-developed  HEAD:  Atraumatic, Normocephalic  EYES: EOMI, PERRLA, conjunctiva and sclera clear  CHEST/LUNG: Clear to auscultation bilaterally; No wheeze  HEART: Regular rate and rhythm; No murmurs, rubs, or gallops  ABDOMEN: Soft, Nontender, Nondistended; Bowel sounds present  EXTREMITIES:  2+ Peripheral Pulses, No clubbing, cyanosis, or edema  PSYCH: AAOx3  SKIN: No rashes or lesions    LABS:                        12.6   6.9   )-----------( 180      ( 25 Jun 2017 06:40 )             38.6     06-25    136  |  103  |  7   ----------------------------<  110<H>  3.8   |  24  |  0.72    Ca    8.4      25 Jun 2017 06:38  Phos  3.4     06-25  Mg     2.1     06-25      PT/INR - ( 25 Jun 2017 06:38 )   PT: 28.3 sec;   INR: 2.55 ratio         PTT - ( 25 Jun 2017 06:38 )  PTT:> 200 sec          RADIOLOGY & ADDITIONAL TESTS:    Imaging Personally Reviewed:  Consultant(s) Notes Reviewed:    Care Discussed with Consultants/Other Providers:

## 2017-06-25 NOTE — PROGRESS NOTE ADULT - ATTENDING COMMENTS
Patient seen and examined with fellow. pt clinically improved, continue miralax, f/u ct scan result.
76yoM a/w STEMI s/p BMS to pLAD course c/b psuedo-obstruction, now having BMs daily, started on coumadin for low flow state.  INR increased from 1.3 yesterday to 2.5.  Instructed patient to hold coumadin tonight and tomorrow given fast increase and check INR on Tuesday with his PCP (has appointment).  d/w niece who is an RN and she understands/agrees.  Patient would like to go home today and does not want to wait until tomorrow to check his INR.
76 M with no medical history transferred from [facility] for urgent cath for Inferolateral STEMI, s/p bare metal stent to [facility] (100%), hospital course c/b colonic pseudoobstruction.     1. Acute ST elevation MI, s/p PCI and stent in [facility] with low flow state-  No c/o chest pain, SOB. Card f/u noted.  - c/w ASA, Plavix, Coreg, Statin, Lisinopril and AC    2. Colonic pseudo-obstruction-  No abdominal pain, Has BS, having BM, no n/v and abdominal distension is down. Abdominal Xray showed persistent sigmoid dilation.   - GI recommendation noted.  - c/w clears, serial abdominal exam. Stool softeners, Laxatives. If persistent might need rectal tube. Outpatient colonoscopy per GI.
76 M with no medical history transferred from [facility] for urgent cath for Inferolateral STEMI, s/p bare metal stent to [facility] (100%), hospital course c/b colonic pseudoobstruction.     1. Acute ST [facility]-  No c/o chest pain, SOB. Card f/u noted.  - c/w ASA, Plavix, Coreg, Statin, Lisinopril and AC    2. Colonic pseudo-obstruction-  Has been having BM, no n/v and abdominal distension is down today as per patient. Reviewed AXray. GI recommended noted.  -c/w clears, serial abdominal exam. Stool softeners, Laxatives. If persistent might need rectal tube. Outpatient colonoscopy per GI
76 M with no medical history transferred from [facility] for urgent cath for Inferolateral STEMI, s/p bare metal stent to [facility] (100%), hospital course c/b colonic pseudoobstruction.     1. Acute ST elevation MI, s/p PCI and stent in [facility] with low flow state-   Patient denies chest pain, SOB. Card f/u noted.  - c/w ASA, Plavix, Coreg, Statin, Lisinopril and AC  - Card f/u noted.    2. Colonic pseudo-obstruction-  Has BS, having BM, no n/v and abdominal distension is down. Abdominal exam is benign. Reviewed abd xray   - GI recommendation noted.  - Advance diet today. Stool softeners, Laxatives. Outpatient colonoscopy per GI..

## 2017-06-25 NOTE — PROGRESS NOTE ADULT - PROBLEM SELECTOR PROBLEM 2
Colonic pseudoobstruction

## 2017-06-25 NOTE — PROGRESS NOTE ADULT - PROBLEM SELECTOR PROBLEM 3
LFT elevation
Need for prophylactic measure
Need for prophylactic measure
LFT elevation

## 2017-06-25 NOTE — PROGRESS NOTE ADULT - PROBLEM SELECTOR PROBLEM 1
ST elevation myocardial infarction (STEMI), unspecified artery

## 2017-06-25 NOTE — PROGRESS NOTE ADULT - ASSESSMENT
76 M with no medical hx transferred from [facility] for urgent cath for [facility]fer[facility] course c/b colonic pseudoobstruction.
76M with no medical hx transferred from [facility] for urgent cath for [facility] c/b colonic pseudoobstruction.
76M with no medical hx transferred from [facility] for urgent [facility] for [facility] c/b colonic pseudoobstruction.
76 M with no medical hx transferred from [facility] for urgent cath for [facility] c/b colonic pseudoobstruction.
76 M with no medical hx transferred from [facility] for urgent cath for [facility]ferolateral [facility] c/b colonic pseudoobstruction.
76 M with no medical hx transferred from [facility] for urgent cath for [facility] c/b colonic pseudoobstruction.
76 M with no medical hx transferred from [facility] for urgent cath for [facility]fer[facility] c/b colonic pseudoobstruction.
76 year-old gentleman presents with abdominal pain, seen to have AW STEMI, now status post PCI to LAD (with BMS because of concern for lung mass). Although no lung mass has been seen, patient has significantly dilated loop of bowel.   GI input appreciated.  Continue DAPT, high dose statin, beta-blocker, and ACEi.  Continue Heparin gtt to warfarin bridge for LV aneurysm with concern for spontaneous echo contrast.  Patient has outpatient cardiology follow-up through clinic on [facility].
76 year-old gentleman presents with abdominal pain, seen to have AW STEMI, now status post PCI to LAD (with BMS because of concern for lung mass). Although no lung mass has been seen, patient has significantly dilated loop of bowel.   GI input appreciated.  Continue DAPT, high dose statin, beta-blocker, and ACEi.  Continue Heparin gtt to warfarin bridge for LV aneurysm with concern for spontaneous echo contrast.  Patient has outpatient cardiology follow-up through clinic on [facility].    For any questions over the weekend, please page [phone] or call [phone].
Sigmoid Distension : Clinically improved.     Continue Miralax once daily.     Monitor Lytes and correct them as appropriate.     Repeat KUB today.     Will need colonoscopy as outpatient after cardiac clearance.     Recall GI prn.

## 2017-06-25 NOTE — PROGRESS NOTE ADULT - PROBLEM SELECTOR PLAN 3
- Heparin gtt in setting of apical ballooning post MI.  - dosed coumadin  - trend pt/inr/ptt
- INR within therapeutic range at 2.55. Heparin gtt stopped  - dosed coumadin  - trend pt/inr/ptt  - Pt has PCP appointment Tuesday June 27th at 9:45AM for F/U and to check INR.
-Possibly secondary to starting statin therapy  - continue statin per cardiology attending
-Possibly secondary to starting statin therapy

## 2017-07-01 PROCEDURE — G9001: CPT

## 2017-07-23 PROCEDURE — 71045 X-RAY EXAM CHEST 1 VIEW: CPT

## 2017-07-23 PROCEDURE — C1894: CPT

## 2017-07-23 PROCEDURE — 80048 BASIC METABOLIC PNL TOTAL CA: CPT

## 2017-07-23 PROCEDURE — 84484 ASSAY OF TROPONIN QUANT: CPT

## 2017-07-23 PROCEDURE — 82553 CREATINE MB FRACTION: CPT

## 2017-07-23 PROCEDURE — 80053 COMPREHEN METABOLIC PANEL: CPT

## 2017-07-23 PROCEDURE — 83735 ASSAY OF MAGNESIUM: CPT

## 2017-07-23 PROCEDURE — C1876: CPT

## 2017-07-23 PROCEDURE — 85027 COMPLETE CBC AUTOMATED: CPT

## 2017-07-23 PROCEDURE — C9606: CPT | Mod: LD

## 2017-07-23 PROCEDURE — C8929: CPT

## 2017-07-23 PROCEDURE — 85610 PROTHROMBIN TIME: CPT

## 2017-07-23 PROCEDURE — C1887: CPT

## 2017-07-23 PROCEDURE — C1769: CPT

## 2017-07-23 PROCEDURE — 83036 HEMOGLOBIN GLYCOSYLATED A1C: CPT

## 2017-07-23 PROCEDURE — 71250 CT THORAX DX C-: CPT

## 2017-07-23 PROCEDURE — 82550 ASSAY OF CK (CPK): CPT

## 2017-07-23 PROCEDURE — 84100 ASSAY OF PHOSPHORUS: CPT

## 2017-07-23 PROCEDURE — C1725: CPT

## 2017-07-23 PROCEDURE — 74177 CT ABD & PELVIS W/CONTRAST: CPT

## 2017-07-23 PROCEDURE — 84443 ASSAY THYROID STIM HORMONE: CPT

## 2017-07-23 PROCEDURE — 93005 ELECTROCARDIOGRAM TRACING: CPT

## 2017-07-23 PROCEDURE — 85730 THROMBOPLASTIN TIME PARTIAL: CPT

## 2017-07-23 PROCEDURE — 74018 RADEX ABDOMEN 1 VIEW: CPT

## 2017-07-23 PROCEDURE — 93458 L HRT ARTERY/VENTRICLE ANGIO: CPT | Mod: 59

## 2017-07-23 PROCEDURE — 80061 LIPID PANEL: CPT

## 2017-09-27 NOTE — PROGRESS NOTE ADULT - I WAS PHYSICALLY PRESENT FOR THE KEY PORTIONS OF THE EVALUATION AND MANAGEMENT (E/M) SERVICE PROVIDED.  I AGREE WITH THE ABOVE HISTORY, PHYSICAL, AND PLAN WHICH I HAVE REVIEWED AND EDITED WHERE APPROPRIATE
Statement Selected
Leukocytosis, unspecified type
Statement Selected
Statement Selected
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
## 2021-12-05 NOTE — H&P ADULT - NEGATIVE ENMT SYMPTOMS
no sinus symptoms/no nasal discharge/no nasal congestion/no ear pain/no hearing difficulty
05-Dec-2021 10:25

## 2024-01-30 ENCOUNTER — P2P PATIENT RECORD (OUTPATIENT)
Age: 84
End: 2024-01-30